# Patient Record
Sex: MALE | Race: WHITE | NOT HISPANIC OR LATINO | URBAN - METROPOLITAN AREA
[De-identification: names, ages, dates, MRNs, and addresses within clinical notes are randomized per-mention and may not be internally consistent; named-entity substitution may affect disease eponyms.]

---

## 2017-04-22 ENCOUNTER — EMERGENCY (EMERGENCY)
Facility: HOSPITAL | Age: 27
LOS: 1 days | Discharge: HOME | End: 2017-04-22
Attending: EMERGENCY MEDICINE | Admitting: EMERGENCY MEDICINE
Payer: MEDICAID

## 2017-04-22 VITALS
DIASTOLIC BLOOD PRESSURE: 66 MMHG | HEART RATE: 80 BPM | SYSTOLIC BLOOD PRESSURE: 115 MMHG | OXYGEN SATURATION: 100 % | RESPIRATION RATE: 20 BRPM | HEIGHT: 69 IN | TEMPERATURE: 98 F | WEIGHT: 175.05 LBS

## 2017-04-22 DIAGNOSIS — F11.10 OPIOID ABUSE, UNCOMPLICATED: ICD-10-CM

## 2017-04-22 DIAGNOSIS — Z87.891 PERSONAL HISTORY OF NICOTINE DEPENDENCE: ICD-10-CM

## 2017-04-22 DIAGNOSIS — Z79.899 OTHER LONG TERM (CURRENT) DRUG THERAPY: ICD-10-CM

## 2017-04-22 DIAGNOSIS — R07.89 OTHER CHEST PAIN: ICD-10-CM

## 2017-04-22 PROCEDURE — 99284 EMERGENCY DEPT VISIT MOD MDM: CPT

## 2017-04-22 PROCEDURE — 71020: CPT | Mod: 26

## 2017-04-22 PROCEDURE — 93010 ELECTROCARDIOGRAM REPORT: CPT

## 2017-04-22 PROCEDURE — 99284 EMERGENCY DEPT VISIT MOD MDM: CPT | Mod: 25

## 2017-04-22 PROCEDURE — 71046 X-RAY EXAM CHEST 2 VIEWS: CPT

## 2017-04-22 PROCEDURE — 93005 ELECTROCARDIOGRAM TRACING: CPT

## 2017-04-22 NOTE — ED PROVIDER NOTE - OBJECTIVE STATEMENT
pt presents with left sided intermittent achy chest pain no h.o dvt or pe last ivda 2 days prior denies SI or HI no hemoptysis no murmur no h.o endocarditis. denies fever. denies HA or neck pain. no  sob. no abd pain. no n/v/d. no urinary f/u/d. no back pain. no motor or sensory deficits. denies drug use. no recent travel. no rash. no other acute issues symptoms or concerns. no h.o exertional syncope or early sudden cardiac death in family

## 2017-04-22 NOTE — ED ADULT TRIAGE NOTE - CHIEF COMPLAINT QUOTE
Patient arrived to ED today with c/o chest pain after taking Suboxone starting yesterday.  Patient states he has also chills.

## 2017-04-22 NOTE — ED ADULT NURSE NOTE - OBJECTIVE STATEMENT
Patient recd this morning A/Ox3, VSS, c/o left sided chest pain and generalized body aches that started yesterday.  Patient states he used heroin 2 days ago and currently taking suboxone. Respirations are even and unlabored, lungs cta, +bowel x4 quads, abdomen soft, nontender/nondistended, skin w/d/i. Patient states he feels nauseous and wants to vomit, reports diarrhea.

## 2017-04-22 NOTE — ED PROVIDER NOTE - MEDICAL DECISION MAKING DETAILS
pt refusing resources given to mother suspect opiod withdrawal compenent last opiod use 2 days prior he is ambulating in ed no no neuro deficits do not suspect endocarditis return to ed for intractable chest pain or sob.

## 2017-04-22 NOTE — ED ADULT NURSE REASSESSMENT NOTE - NS ED NURSE REASSESS COMMENT FT1
Patient wanted to stay at  [facility] for detox, was advised to see SBIRT, patient decided he wanted to leave and walked out before signing discharge papers.

## 2017-04-22 NOTE — ED ADULT NURSE NOTE - NS ED NURSE DC INFO COMPLEXITY
Returned Demonstration/Simple: Patient demonstrates quick and easy understanding/Verbalized Understanding/Patient asked questions

## 2017-04-22 NOTE — ED BEHAVIORAL HEALTH NOTE - BEHAVIORAL HEALTH NOTE
SW Note- SW attempted to meet with pt bedside to provide substance abuse resources and referrals. Pt no longer in hospital bed, d/c'd and left the ED. SW available in the event that pt returns.

## 2017-09-28 ENCOUNTER — APPOINTMENT (OUTPATIENT)
Dept: GASTROENTEROLOGY | Facility: CLINIC | Age: 27
End: 2017-09-28

## 2017-11-01 ENCOUNTER — OUTPATIENT (OUTPATIENT)
Dept: OUTPATIENT SERVICES | Facility: HOSPITAL | Age: 27
LOS: 1 days | End: 2017-11-01
Payer: MEDICAID

## 2017-11-01 PROCEDURE — G9001: CPT

## 2017-11-07 DIAGNOSIS — R69 ILLNESS, UNSPECIFIED: ICD-10-CM

## 2017-11-09 ENCOUNTER — APPOINTMENT (OUTPATIENT)
Dept: GASTROENTEROLOGY | Facility: CLINIC | Age: 27
End: 2017-11-09

## 2018-03-01 ENCOUNTER — OUTPATIENT (OUTPATIENT)
Dept: OUTPATIENT SERVICES | Facility: HOSPITAL | Age: 28
LOS: 1 days | End: 2018-03-01
Payer: MEDICAID

## 2018-03-01 PROCEDURE — G9001: CPT

## 2018-03-09 DIAGNOSIS — R69 ILLNESS, UNSPECIFIED: ICD-10-CM

## 2018-09-28 PROBLEM — F11.10 OPIOID ABUSE, UNCOMPLICATED: Chronic | Status: ACTIVE | Noted: 2017-04-22

## 2018-10-03 ENCOUNTER — TRANSCRIPTION ENCOUNTER (OUTPATIENT)
Age: 28
End: 2018-10-03

## 2018-11-19 ENCOUNTER — APPOINTMENT (OUTPATIENT)
Dept: GASTROENTEROLOGY | Facility: CLINIC | Age: 28
End: 2018-11-19

## 2019-07-25 ENCOUNTER — EMERGENCY (EMERGENCY)
Facility: HOSPITAL | Age: 29
LOS: 1 days | Discharge: LEFT BEFORE DISCHARGE | End: 2019-07-25
Attending: EMERGENCY MEDICINE
Payer: MEDICAID

## 2019-07-25 VITALS
HEART RATE: 83 BPM | HEIGHT: 67 IN | DIASTOLIC BLOOD PRESSURE: 79 MMHG | OXYGEN SATURATION: 100 % | RESPIRATION RATE: 16 BRPM | TEMPERATURE: 98 F | WEIGHT: 160.06 LBS | SYSTOLIC BLOOD PRESSURE: 147 MMHG

## 2019-07-25 PROCEDURE — 99283 EMERGENCY DEPT VISIT LOW MDM: CPT

## 2019-07-25 PROCEDURE — 99282 EMERGENCY DEPT VISIT SF MDM: CPT

## 2019-07-25 NOTE — ED ADULT TRIAGE NOTE - CHIEF COMPLAINT QUOTE
Pt BIBA awake and alert after doing heroin. Pt with stable VS and does not wish to be treated. MD called to triage to evaluate patient, patient is OK to leave AMA

## 2019-07-25 NOTE — ED PROVIDER NOTE - CLINICAL SUMMARY MEDICAL DECISION MAKING FREE TEXT BOX
pt is currently awake, alert, lucid and coherent with mild signs of opioid withdrawal, no acute signs of intoxication. Pt refusing service, displays decision making capacity, advised pt that narcan has short half life and he is at risk for recurrent overdose and death and permanent disability, which he states he understands. pt walked out prior to signing papers

## 2019-07-25 NOTE — ED PROVIDER NOTE - OBJECTIVE STATEMENT
29 year old male with PMH IVDA presents following heroin use. pt states that he used heroin today, was found obtunded, narcan administered and he woke up. Pt states he has no complaints at this time other than nausea. Pt states he would like to refuse service and sign himself out against medical advice

## 2019-11-08 ENCOUNTER — EMERGENCY (EMERGENCY)
Facility: HOSPITAL | Age: 29
LOS: 1 days | Discharge: HOME | End: 2019-11-08
Attending: EMERGENCY MEDICINE
Payer: MEDICAID

## 2019-11-08 VITALS
WEIGHT: 175.05 LBS | HEART RATE: 65 BPM | HEIGHT: 69 IN | DIASTOLIC BLOOD PRESSURE: 47 MMHG | RESPIRATION RATE: 18 BRPM | SYSTOLIC BLOOD PRESSURE: 110 MMHG | OXYGEN SATURATION: 97 % | TEMPERATURE: 98 F

## 2019-11-08 LAB
ANION GAP SERPL CALC-SCNC: 15 MMOL/L — SIGNIFICANT CHANGE UP (ref 5–17)
BASOPHILS # BLD AUTO: 0.02 K/UL — SIGNIFICANT CHANGE UP (ref 0–0.2)
BASOPHILS NFR BLD AUTO: 0.3 % — SIGNIFICANT CHANGE UP (ref 0–2)
BUN SERPL-MCNC: 8 MG/DL — SIGNIFICANT CHANGE UP (ref 8–20)
CALCIUM SERPL-MCNC: 9.4 MG/DL — SIGNIFICANT CHANGE UP (ref 8.6–10.2)
CHLORIDE SERPL-SCNC: 99 MMOL/L — SIGNIFICANT CHANGE UP (ref 98–107)
CO2 SERPL-SCNC: 22 MMOL/L — SIGNIFICANT CHANGE UP (ref 22–29)
CREAT SERPL-MCNC: 0.74 MG/DL — SIGNIFICANT CHANGE UP (ref 0.5–1.3)
EOSINOPHIL # BLD AUTO: 0.1 K/UL — SIGNIFICANT CHANGE UP (ref 0–0.5)
EOSINOPHIL NFR BLD AUTO: 1.4 % — SIGNIFICANT CHANGE UP (ref 0–6)
GLUCOSE SERPL-MCNC: 99 MG/DL — SIGNIFICANT CHANGE UP (ref 70–115)
HCT VFR BLD CALC: 52.1 % — HIGH (ref 39–50)
HGB BLD-MCNC: 16.1 G/DL — SIGNIFICANT CHANGE UP (ref 13–17)
IMM GRANULOCYTES NFR BLD AUTO: 0.4 % — SIGNIFICANT CHANGE UP (ref 0–1.5)
LYMPHOCYTES # BLD AUTO: 1.11 K/UL — SIGNIFICANT CHANGE UP (ref 1–3.3)
LYMPHOCYTES # BLD AUTO: 15.8 % — SIGNIFICANT CHANGE UP (ref 13–44)
MCHC RBC-ENTMCNC: 29.3 PG — SIGNIFICANT CHANGE UP (ref 27–34)
MCHC RBC-ENTMCNC: 30.9 GM/DL — LOW (ref 32–36)
MCV RBC AUTO: 94.7 FL — SIGNIFICANT CHANGE UP (ref 80–100)
MONOCYTES # BLD AUTO: 0.33 K/UL — SIGNIFICANT CHANGE UP (ref 0–0.9)
MONOCYTES NFR BLD AUTO: 4.7 % — SIGNIFICANT CHANGE UP (ref 2–14)
NEUTROPHILS # BLD AUTO: 5.45 K/UL — SIGNIFICANT CHANGE UP (ref 1.8–7.4)
NEUTROPHILS NFR BLD AUTO: 77.4 % — HIGH (ref 43–77)
PLATELET # BLD AUTO: 156 K/UL — SIGNIFICANT CHANGE UP (ref 150–400)
POTASSIUM SERPL-MCNC: 5.4 MMOL/L — HIGH (ref 3.5–5.3)
POTASSIUM SERPL-SCNC: 5.4 MMOL/L — HIGH (ref 3.5–5.3)
RBC # BLD: 5.5 M/UL — SIGNIFICANT CHANGE UP (ref 4.2–5.8)
RBC # FLD: 12.4 % — SIGNIFICANT CHANGE UP (ref 10.3–14.5)
SODIUM SERPL-SCNC: 136 MMOL/L — SIGNIFICANT CHANGE UP (ref 135–145)
WBC # BLD: 7.04 K/UL — SIGNIFICANT CHANGE UP (ref 3.8–10.5)
WBC # FLD AUTO: 7.04 K/UL — SIGNIFICANT CHANGE UP (ref 3.8–10.5)

## 2019-11-08 PROCEDURE — 99283 EMERGENCY DEPT VISIT LOW MDM: CPT

## 2019-11-08 PROCEDURE — 99284 EMERGENCY DEPT VISIT MOD MDM: CPT

## 2019-11-08 PROCEDURE — 80048 BASIC METABOLIC PNL TOTAL CA: CPT

## 2019-11-08 PROCEDURE — 85027 COMPLETE CBC AUTOMATED: CPT

## 2019-11-08 PROCEDURE — 36415 COLL VENOUS BLD VENIPUNCTURE: CPT

## 2019-11-08 RX ORDER — METHADONE HYDROCHLORIDE 40 MG/1
60 TABLET ORAL ONCE
Refills: 0 | Status: DISCONTINUED | OUTPATIENT
Start: 2019-11-08 | End: 2019-11-08

## 2019-11-08 RX ADMIN — METHADONE HYDROCHLORIDE 60 MILLIGRAM(S): 40 TABLET ORAL at 13:26

## 2019-11-08 NOTE — SBIRT NOTE ADULT - NSSBIRTDRGPASSREFTXDET_GEN_A_CORE
Provided SBIRT services: Full screen positive. Referral to Treatment attempted. Screening results were reviewed with the patient and patient was provided information about healthy guidelines and potential negative consequences associated with level of risk. Motivation and readiness to reduce or stop use was discussed and goals and activities to make changes were suggested/offered.  Options discussed for further evaluation and treatment, but referral to treatment was not completed because  Pt is currently at the [facility] -  recommended [facility] and/or [email or web address]

## 2019-11-08 NOTE — ED STATDOCS - PATIENT PORTAL LINK FT
You can access the FollowMyHealth Patient Portal offered by [facility] by registering at the following website: [email or web address]. By joining [employer]’s FollowMyHealth portal, you will also be able to view your health information using other applications (apps) compatible with our system.

## 2019-11-08 NOTE — ED STATDOCS - OBJECTIVE STATEMENT
30y/o M with PMHx of Heroine abuse presents to the ED c/o left hand swelling s/p injecting heroine into his arm, onset today. Pt reports arm started burning after injection onset so he stopped. Pt uses Heroine once a week. He reports that he is on the Methadone program and is trying to stop. Denies N/V. No additional complaints at this time.

## 2019-11-08 NOTE — ED STATDOCS - PROGRESS NOTE DETAILS
Pt moved form intake Room. Pt seen and evaluated by intake Physician. HPI, Physical examination performed by intake Physician . Note reviewed and followup examination performed by me consistent with initial assessment. Agrees with intake Physician plan and tests. Pt admits to using Heroin today prior to presenting in the ED. Pt Methadone Program was called and informed about pt use today. Lucia CHERRY states that Pt can be dosed if he does not appears impaired. Pt given resources for [facility] and [facility] today . Pt spoke to JASON.

## 2019-11-08 NOTE — ED STATDOCS - CLINICAL SUMMARY MEDICAL DECISION MAKING FREE TEXT BOX
Pt with swelling to LUE after shooting Heroine, will check labs, US and reassess. Pt with swelling to LUE after shooting Heroine, will check labs, US and reassess.  imaging cancelled   signed AMA bc of long waiti time  no white count or fever  risk benefit alternative discussed  pt verbalized understanding of all and signed ama  encouraged to return for any problems including fever redness increased swelling or pain

## 2019-11-08 NOTE — ED ADULT NURSE NOTE - OBJECTIVE STATEMENT
pt care assumed at 1135, no apparent distress noted at this time. pt received Alert and Oriented to person, place, situation and time sitting in bed with mother at bedside. pt c/o left hand swelling s/p injecting self this morning. pt states needle is fully out. pt has swelling to left hand. pt able to move extremity. pt awaiting sono at this time. pt educated on plan of care, pt able to successfully teach back plan of care to RN, RN will continue to reeducate pt during hospital stay.
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
## 2019-11-08 NOTE — ED STATDOCS - MUSCULOSKELETAL, MLM
range of motion is not limited and there is no muscle tenderness. range of motion is not limited and there is no muscle tenderness. Neuro vascular intact.

## 2019-11-08 NOTE — ED STATDOCS - ATTENDING CONTRIBUTION TO CARE
I, Kirsten River, performed the initial face to face bedside interview with this patient regarding history of present illness, review of symptoms and relevant past medical, social and family history.  I completed an independent physical examination.  I was the initial provider who evaluated this patient. I have signed out the follow up of any pending tests (i.e. labs, radiological studies) to the ACP.  I have communicated the patient’s plan of care and disposition with the ACP.  The history, relevant review of systems, past medical and surgical history, medical decision making, and physical examination was documented by the scribe in my presence and I attest to the accuracy of the documentation.

## 2019-12-26 ENCOUNTER — EMERGENCY (EMERGENCY)
Facility: HOSPITAL | Age: 29
LOS: 1 days | Discharge: LEFT BEFORE DISCHARGE | End: 2019-12-26

## 2020-01-08 ENCOUNTER — EMERGENCY (EMERGENCY)
Facility: HOSPITAL | Age: 30
LOS: 1 days | End: 2020-01-08

## 2020-01-10 ENCOUNTER — APPOINTMENT (OUTPATIENT)
Dept: NEUROLOGY | Facility: CLINIC | Age: 30
End: 2020-01-10

## 2020-01-13 ENCOUNTER — APPOINTMENT (OUTPATIENT)
Dept: GASTROENTEROLOGY | Facility: CLINIC | Age: 30
End: 2020-01-13

## 2020-02-13 ENCOUNTER — EMERGENCY (EMERGENCY)
Facility: HOSPITAL | Age: 30
LOS: 1 days | Discharge: HOME | End: 2020-02-13
Attending: EMERGENCY MEDICINE
Payer: MEDICAID

## 2020-02-13 VITALS
SYSTOLIC BLOOD PRESSURE: 128 MMHG | WEIGHT: 190.04 LBS | TEMPERATURE: 101 F | HEIGHT: 69 IN | OXYGEN SATURATION: 100 % | HEART RATE: 99 BPM | RESPIRATION RATE: 24 BRPM | DIASTOLIC BLOOD PRESSURE: 60 MMHG

## 2020-02-13 LAB
ALBUMIN SERPL ELPH-MCNC: 3.9 G/DL — SIGNIFICANT CHANGE UP (ref 3.3–5.2)
ALP SERPL-CCNC: 108 U/L — SIGNIFICANT CHANGE UP (ref 40–120)
ALT FLD-CCNC: 26 U/L — SIGNIFICANT CHANGE UP
ANION GAP SERPL CALC-SCNC: 17 MMOL/L — SIGNIFICANT CHANGE UP (ref 5–17)
APPEARANCE UR: CLEAR — SIGNIFICANT CHANGE UP
APTT BLD: 25.1 SEC — LOW (ref 27.5–36.3)
AST SERPL-CCNC: 42 U/L — HIGH
BACTERIA # UR AUTO: ABNORMAL
BASOPHILS # BLD AUTO: 0.01 K/UL — SIGNIFICANT CHANGE UP (ref 0–0.2)
BASOPHILS NFR BLD AUTO: 0.2 % — SIGNIFICANT CHANGE UP (ref 0–2)
BILIRUB SERPL-MCNC: 0.3 MG/DL — LOW (ref 0.4–2)
BILIRUB UR-MCNC: NEGATIVE — SIGNIFICANT CHANGE UP
BUN SERPL-MCNC: 11 MG/DL — SIGNIFICANT CHANGE UP (ref 8–20)
CALCIUM SERPL-MCNC: 9.3 MG/DL — SIGNIFICANT CHANGE UP (ref 8.6–10.2)
CHLORIDE SERPL-SCNC: 100 MMOL/L — SIGNIFICANT CHANGE UP (ref 98–107)
CO2 SERPL-SCNC: 22 MMOL/L — SIGNIFICANT CHANGE UP (ref 22–29)
COLOR SPEC: YELLOW — SIGNIFICANT CHANGE UP
CREAT SERPL-MCNC: 0.97 MG/DL — SIGNIFICANT CHANGE UP (ref 0.5–1.3)
DIFF PNL FLD: NEGATIVE — SIGNIFICANT CHANGE UP
EOSINOPHIL # BLD AUTO: 0.01 K/UL — SIGNIFICANT CHANGE UP (ref 0–0.5)
EOSINOPHIL NFR BLD AUTO: 0.2 % — SIGNIFICANT CHANGE UP (ref 0–6)
EPI CELLS # UR: NEGATIVE — SIGNIFICANT CHANGE UP
GLUCOSE SERPL-MCNC: 89 MG/DL — SIGNIFICANT CHANGE UP (ref 70–99)
GLUCOSE UR QL: NEGATIVE MG/DL — SIGNIFICANT CHANGE UP
HCT VFR BLD CALC: 45.3 % — SIGNIFICANT CHANGE UP (ref 39–50)
HGB BLD-MCNC: 14.6 G/DL — SIGNIFICANT CHANGE UP (ref 13–17)
IMM GRANULOCYTES NFR BLD AUTO: 0.4 % — SIGNIFICANT CHANGE UP (ref 0–1.5)
INR BLD: 1.01 RATIO — SIGNIFICANT CHANGE UP (ref 0.88–1.16)
KETONES UR-MCNC: NEGATIVE — SIGNIFICANT CHANGE UP
LACTATE BLDV-MCNC: 2 MMOL/L — SIGNIFICANT CHANGE UP (ref 0.5–2)
LACTATE BLDV-MCNC: 4.1 MMOL/L — CRITICAL HIGH (ref 0.5–2)
LEUKOCYTE ESTERASE UR-ACNC: ABNORMAL
LYMPHOCYTES # BLD AUTO: 0.77 K/UL — LOW (ref 1–3.3)
LYMPHOCYTES # BLD AUTO: 17.1 % — SIGNIFICANT CHANGE UP (ref 13–44)
MCHC RBC-ENTMCNC: 30.4 PG — SIGNIFICANT CHANGE UP (ref 27–34)
MCHC RBC-ENTMCNC: 32.2 GM/DL — SIGNIFICANT CHANGE UP (ref 32–36)
MCV RBC AUTO: 94.4 FL — SIGNIFICANT CHANGE UP (ref 80–100)
MONOCYTES # BLD AUTO: 0.05 K/UL — SIGNIFICANT CHANGE UP (ref 0–0.9)
MONOCYTES NFR BLD AUTO: 1.1 % — LOW (ref 2–14)
NEUTROPHILS # BLD AUTO: 3.63 K/UL — SIGNIFICANT CHANGE UP (ref 1.8–7.4)
NEUTROPHILS NFR BLD AUTO: 81 % — HIGH (ref 43–77)
NITRITE UR-MCNC: NEGATIVE — SIGNIFICANT CHANGE UP
PH UR: 6 — SIGNIFICANT CHANGE UP (ref 5–8)
PLATELET # BLD AUTO: 177 K/UL — SIGNIFICANT CHANGE UP (ref 150–400)
POTASSIUM SERPL-MCNC: 5.3 MMOL/L — SIGNIFICANT CHANGE UP (ref 3.5–5.3)
POTASSIUM SERPL-SCNC: 5.3 MMOL/L — SIGNIFICANT CHANGE UP (ref 3.5–5.3)
PROT SERPL-MCNC: 7.1 G/DL — SIGNIFICANT CHANGE UP (ref 6.6–8.7)
PROT UR-MCNC: NEGATIVE MG/DL — SIGNIFICANT CHANGE UP
PROTHROM AB SERPL-ACNC: 11.4 SEC — SIGNIFICANT CHANGE UP (ref 10–12.9)
RAPID RVP RESULT: SIGNIFICANT CHANGE UP
RBC # BLD: 4.8 M/UL — SIGNIFICANT CHANGE UP (ref 4.2–5.8)
RBC # FLD: 12.8 % — SIGNIFICANT CHANGE UP (ref 10.3–14.5)
RBC CASTS # UR COMP ASSIST: SIGNIFICANT CHANGE UP /HPF (ref 0–4)
SODIUM SERPL-SCNC: 139 MMOL/L — SIGNIFICANT CHANGE UP (ref 135–145)
SP GR SPEC: 1.01 — SIGNIFICANT CHANGE UP (ref 1.01–1.02)
UROBILINOGEN FLD QL: NEGATIVE MG/DL — SIGNIFICANT CHANGE UP
WBC # BLD: 4.49 K/UL — SIGNIFICANT CHANGE UP (ref 3.8–10.5)
WBC # FLD AUTO: 4.49 K/UL — SIGNIFICANT CHANGE UP (ref 3.8–10.5)
WBC UR QL: SIGNIFICANT CHANGE UP

## 2020-02-13 PROCEDURE — 93010 ELECTROCARDIOGRAM REPORT: CPT

## 2020-02-13 PROCEDURE — 71045 X-RAY EXAM CHEST 1 VIEW: CPT | Mod: 26

## 2020-02-13 PROCEDURE — 99291 CRITICAL CARE FIRST HOUR: CPT

## 2020-02-13 RX ORDER — IBUPROFEN 200 MG
600 TABLET ORAL ONCE
Refills: 0 | Status: COMPLETED | OUTPATIENT
Start: 2020-02-13 | End: 2020-02-13

## 2020-02-13 RX ORDER — SODIUM CHLORIDE 9 MG/ML
2700 INJECTION INTRAMUSCULAR; INTRAVENOUS; SUBCUTANEOUS ONCE
Refills: 0 | Status: COMPLETED | OUTPATIENT
Start: 2020-02-13 | End: 2020-02-13

## 2020-02-13 RX ORDER — ACETAMINOPHEN 500 MG
975 TABLET ORAL ONCE
Refills: 0 | Status: COMPLETED | OUTPATIENT
Start: 2020-02-13 | End: 2020-02-13

## 2020-02-13 RX ORDER — AZITHROMYCIN 500 MG/1
500 TABLET, FILM COATED ORAL ONCE
Refills: 0 | Status: COMPLETED | OUTPATIENT
Start: 2020-02-13 | End: 2020-02-13

## 2020-02-13 RX ORDER — CEFTRIAXONE 500 MG/1
1000 INJECTION, POWDER, FOR SOLUTION INTRAMUSCULAR; INTRAVENOUS ONCE
Refills: 0 | Status: COMPLETED | OUTPATIENT
Start: 2020-02-13 | End: 2020-02-13

## 2020-02-13 RX ADMIN — SODIUM CHLORIDE 2700 MILLILITER(S): 9 INJECTION INTRAMUSCULAR; INTRAVENOUS; SUBCUTANEOUS at 19:59

## 2020-02-13 RX ADMIN — AZITHROMYCIN 255 MILLIGRAM(S): 500 TABLET, FILM COATED ORAL at 21:01

## 2020-02-13 RX ADMIN — CEFTRIAXONE 100 MILLIGRAM(S): 500 INJECTION, POWDER, FOR SOLUTION INTRAMUSCULAR; INTRAVENOUS at 20:00

## 2020-02-13 RX ADMIN — Medication 975 MILLIGRAM(S): at 20:00

## 2020-02-13 NOTE — ED PROVIDER NOTE - NSFOLLOWUPINSTRUCTIONS_ED_ALL_ED_FT
- Follow up with your doctor within 2-3 days.   - Bring results with you to the appointment.   - Take Tylenol (Acetaminophen) 650mg or Motrin (Ibuprofen/Advil) 600mg every 6 hours as needed for pain or fever  - Stay well hydrated.   - Return to the ED for any new or worsening symptoms.     Fever    A fever is an increase in the body's temperature above 100.4°F (38°C) or higher. In adults and children older than three months, a brief mild or moderate fever generally has no long-term effect, and it usually does not require treatment. Many times, fevers are the result of viral infections, which are self-resolving.  However, certain symptoms or diagnostic tests may suggest a bacterial infection that may respond to antibiotics. Take medications as directed by your health care provider.    SEEK IMMEDIATE MEDICAL CARE IF YOU OR YOUR CHILD HAVE ANY OF THE FOLLOWING SYMPTOMS : shortness of breath, seizure, rash/stiff neck/headache, severe abdominal pain, persistent vomiting, any signs of dehydration, or if your child has a fever for over five (5) days.

## 2020-02-13 NOTE — ED ADULT NURSE NOTE - CHIEF COMPLAINT QUOTE
Patient arrived to ED today with c/o body shakes, weakness, delusional as per girlfriend, nausea that started today about an hour ago.  Patient reports he recently had teeth pulled one week ago and did not take any antibiotics.

## 2020-02-13 NOTE — ED PROVIDER NOTE - ATTENDING CONTRIBUTION TO CARE
Danish: I performed a face to face bedside interview with patient regarding history of present illness, review of symptoms and past medical history. I completed an independent physical exam and ordered tests/medications as needed.  I have discussed patient's plan of care with the resident. The resident assisted in  executing the discussed plan. I was available for any questions or issues that may have arose during the execution of the plan of care.

## 2020-02-13 NOTE — ED ADULT TRIAGE NOTE - CHIEF COMPLAINT QUOTE
Patient arrived to ED today with c/o body shakes, nausea that started today about an hour ago.  Patient reports he recently had teeth pulled. Patient arrived to ED today with c/o body shakes, weakness, delusional as per girlfriend, nausea that started today about an hour ago.  Patient reports he recently had teeth pulled one week ago and did not take any antibiotics.

## 2020-02-13 NOTE — ED PROVIDER NOTE - PROGRESS NOTE DETAILS
Danish GAN: pt feeling a little better but still drowsy, not at baseline as per girlfriend at bedside. Pt sent for CTH. Pt is consented for LP. Danish GAN: attempted LP, dry tap x 2. Pt now more awake, as per pt and girlfriend the pt is back to baseline. Pt requesting to go home. Strict return precautions given. Patient and girlfriend verbalize understanding.

## 2020-02-13 NOTE — ED ADULT NURSE NOTE - OBJECTIVE STATEMENT
Pt c/o chills and fever since 7pm today. Pt is drowsy, easily arrousable. Fever 102.4, pt with fluids and abx infusing at this time, sepsis protocol initiated. Pt reports pain 7/10 in his lower flank area radiating to spine, described as shooting pain. As per girlfriend, pt had similar pain in the past when he had kidney stones 6yrs ago. Pt has +N/V. Pt also reports having dental work done 1 wk ago and was not able to fill his abx until 4 days after. Pt states he did not take his abx today.

## 2020-02-13 NOTE — ED PROVIDER NOTE - PHYSICAL EXAMINATION
Gen: Well appearing in NAD  Head: NC/AT  Mouth: S/p multiple tooth extractions, no edema, no tongue elvation, no swelling, no purulence  Neck: trachea midline  CV: RRR  Resp:  CTAB  Ext: no deformities  Neuro:  A&O appears non focal  Skin:  Warm and dry as visualized  Psych:  Normal affect and mood
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
## 2020-02-13 NOTE — ED PROVIDER NOTE - PATIENT PORTAL LINK FT
You can access the FollowMyHealth Patient Portal offered by [facility] by registering at the following website: [email or web address]. By joining [employer]’s FollowMyHealth portal, you will also be able to view your health information using other applications (apps) compatible with our system.

## 2020-02-13 NOTE — ED ADULT NURSE NOTE - NSIMPLEMENTINTERV_GEN_ALL_ED
Implemented All Fall Risk Interventions:  [place] to call system. Call bell, personal items and telephone within reach. Instruct patient to call for assistance. Room bathroom lighting operational. Non-slip footwear when patient is off stretcher. Physically safe environment: no spills, clutter or unnecessary equipment. Stretcher in lowest position, wheels locked, appropriate side rails in place. Provide visual cue, wrist band, yellow gown, etc. Monitor gait and stability. Monitor for mental status changes and reorient to person, place, and time. Review medications for side effects contributing to fall risk. Reinforce activity limits and safety measures with patient and family.

## 2020-02-14 ENCOUNTER — EMERGENCY (EMERGENCY)
Facility: HOSPITAL | Age: 30
LOS: 1 days | Discharge: HOME | End: 2020-02-14
Attending: EMERGENCY MEDICINE
Payer: MEDICAID

## 2020-02-14 VITALS
TEMPERATURE: 98 F | RESPIRATION RATE: 18 BRPM | DIASTOLIC BLOOD PRESSURE: 65 MMHG | SYSTOLIC BLOOD PRESSURE: 118 MMHG | HEART RATE: 82 BPM | OXYGEN SATURATION: 98 %

## 2020-02-14 VITALS
SYSTOLIC BLOOD PRESSURE: 110 MMHG | OXYGEN SATURATION: 97 % | TEMPERATURE: 98 F | DIASTOLIC BLOOD PRESSURE: 50 MMHG | WEIGHT: 190.04 LBS | HEIGHT: 69 IN | HEART RATE: 84 BPM | RESPIRATION RATE: 20 BRPM

## 2020-02-14 LAB
ALBUMIN SERPL ELPH-MCNC: 3.3 G/DL — SIGNIFICANT CHANGE UP (ref 3.3–5.2)
ALP SERPL-CCNC: 71 U/L — SIGNIFICANT CHANGE UP (ref 40–120)
ALT FLD-CCNC: 25 U/L — SIGNIFICANT CHANGE UP
ANION GAP SERPL CALC-SCNC: 13 MMOL/L — SIGNIFICANT CHANGE UP (ref 5–17)
AST SERPL-CCNC: 42 U/L — HIGH
BASOPHILS # BLD AUTO: 0 K/UL — SIGNIFICANT CHANGE UP (ref 0–0.2)
BASOPHILS NFR BLD AUTO: 0 % — SIGNIFICANT CHANGE UP (ref 0–2)
BILIRUB SERPL-MCNC: 0.3 MG/DL — LOW (ref 0.4–2)
BUN SERPL-MCNC: 10 MG/DL — SIGNIFICANT CHANGE UP (ref 8–20)
CALCIUM SERPL-MCNC: 8.7 MG/DL — SIGNIFICANT CHANGE UP (ref 8.6–10.2)
CHLORIDE SERPL-SCNC: 102 MMOL/L — SIGNIFICANT CHANGE UP (ref 98–107)
CO2 SERPL-SCNC: 22 MMOL/L — SIGNIFICANT CHANGE UP (ref 22–29)
CREAT SERPL-MCNC: 0.95 MG/DL — SIGNIFICANT CHANGE UP (ref 0.5–1.3)
CULTURE RESULTS: SIGNIFICANT CHANGE UP
EOSINOPHIL # BLD AUTO: 0 K/UL — SIGNIFICANT CHANGE UP (ref 0–0.5)
EOSINOPHIL NFR BLD AUTO: 0 % — SIGNIFICANT CHANGE UP (ref 0–6)
GIANT PLATELETS BLD QL SMEAR: PRESENT — SIGNIFICANT CHANGE UP
GLUCOSE SERPL-MCNC: 121 MG/DL — HIGH (ref 70–99)
HCT VFR BLD CALC: 40.4 % — SIGNIFICANT CHANGE UP (ref 39–50)
HGB BLD-MCNC: 12.8 G/DL — LOW (ref 13–17)
LACTATE BLDV-MCNC: 2 MMOL/L — SIGNIFICANT CHANGE UP (ref 0.5–2)
LYMPHOCYTES # BLD AUTO: 0.23 K/UL — LOW (ref 1–3.3)
LYMPHOCYTES # BLD AUTO: 1.7 % — LOW (ref 13–44)
MANUAL SMEAR VERIFICATION: SIGNIFICANT CHANGE UP
MCHC RBC-ENTMCNC: 30.1 PG — SIGNIFICANT CHANGE UP (ref 27–34)
MCHC RBC-ENTMCNC: 31.7 GM/DL — LOW (ref 32–36)
MCV RBC AUTO: 95.1 FL — SIGNIFICANT CHANGE UP (ref 80–100)
METAMYELOCYTES # FLD: 2.6 % — HIGH (ref 0–0)
MONOCYTES # BLD AUTO: 0.35 K/UL — SIGNIFICANT CHANGE UP (ref 0–0.9)
MONOCYTES NFR BLD AUTO: 2.6 % — SIGNIFICANT CHANGE UP (ref 2–14)
MYELOCYTES NFR BLD: 1.7 % — HIGH (ref 0–0)
NEUTROPHILS # BLD AUTO: 12.36 K/UL — HIGH (ref 1.8–7.4)
NEUTROPHILS NFR BLD AUTO: 67 % — SIGNIFICANT CHANGE UP (ref 43–77)
NEUTS BAND # BLD: 24.4 % — HIGH (ref 0–8)
PLAT MORPH BLD: NORMAL — SIGNIFICANT CHANGE UP
PLATELET # BLD AUTO: 90 K/UL — LOW (ref 150–400)
POTASSIUM SERPL-MCNC: 3.7 MMOL/L — SIGNIFICANT CHANGE UP (ref 3.5–5.3)
POTASSIUM SERPL-SCNC: 3.7 MMOL/L — SIGNIFICANT CHANGE UP (ref 3.5–5.3)
PROT SERPL-MCNC: 6.1 G/DL — LOW (ref 6.6–8.7)
RBC # BLD: 4.25 M/UL — SIGNIFICANT CHANGE UP (ref 4.2–5.8)
RBC # FLD: 13.2 % — SIGNIFICANT CHANGE UP (ref 10.3–14.5)
RBC BLD AUTO: NORMAL — SIGNIFICANT CHANGE UP
SODIUM SERPL-SCNC: 137 MMOL/L — SIGNIFICANT CHANGE UP (ref 135–145)
SPECIMEN SOURCE: SIGNIFICANT CHANGE UP
WBC # BLD: 13.52 K/UL — HIGH (ref 3.8–10.5)
WBC # FLD AUTO: 13.52 K/UL — HIGH (ref 3.8–10.5)

## 2020-02-14 PROCEDURE — 85027 COMPLETE CBC AUTOMATED: CPT

## 2020-02-14 PROCEDURE — 87486 CHLMYD PNEUM DNA AMP PROBE: CPT

## 2020-02-14 PROCEDURE — 87086 URINE CULTURE/COLONY COUNT: CPT

## 2020-02-14 PROCEDURE — 83605 ASSAY OF LACTIC ACID: CPT

## 2020-02-14 PROCEDURE — 96374 THER/PROPH/DIAG INJ IV PUSH: CPT

## 2020-02-14 PROCEDURE — 36415 COLL VENOUS BLD VENIPUNCTURE: CPT

## 2020-02-14 PROCEDURE — 99285 EMERGENCY DEPT VISIT HI MDM: CPT | Mod: 25

## 2020-02-14 PROCEDURE — 85730 THROMBOPLASTIN TIME PARTIAL: CPT

## 2020-02-14 PROCEDURE — 96375 TX/PRO/DX INJ NEW DRUG ADDON: CPT

## 2020-02-14 PROCEDURE — 93005 ELECTROCARDIOGRAM TRACING: CPT

## 2020-02-14 PROCEDURE — 85610 PROTHROMBIN TIME: CPT

## 2020-02-14 PROCEDURE — 87633 RESP VIRUS 12-25 TARGETS: CPT

## 2020-02-14 PROCEDURE — 70450 CT HEAD/BRAIN W/O DYE: CPT | Mod: 26

## 2020-02-14 PROCEDURE — 81001 URINALYSIS AUTO W/SCOPE: CPT

## 2020-02-14 PROCEDURE — 70450 CT HEAD/BRAIN W/O DYE: CPT

## 2020-02-14 PROCEDURE — 87581 M.PNEUMON DNA AMP PROBE: CPT

## 2020-02-14 PROCEDURE — 80053 COMPREHEN METABOLIC PANEL: CPT

## 2020-02-14 PROCEDURE — 93010 ELECTROCARDIOGRAM REPORT: CPT

## 2020-02-14 PROCEDURE — 99284 EMERGENCY DEPT VISIT MOD MDM: CPT | Mod: 25

## 2020-02-14 PROCEDURE — 87040 BLOOD CULTURE FOR BACTERIA: CPT

## 2020-02-14 PROCEDURE — 99284 EMERGENCY DEPT VISIT MOD MDM: CPT

## 2020-02-14 PROCEDURE — 87798 DETECT AGENT NOS DNA AMP: CPT

## 2020-02-14 PROCEDURE — 71045 X-RAY EXAM CHEST 1 VIEW: CPT

## 2020-02-14 RX ORDER — SODIUM CHLORIDE 9 MG/ML
1000 INJECTION INTRAMUSCULAR; INTRAVENOUS; SUBCUTANEOUS ONCE
Refills: 0 | Status: COMPLETED | OUTPATIENT
Start: 2020-02-14 | End: 2020-02-14

## 2020-02-14 RX ORDER — ONDANSETRON 8 MG/1
4 TABLET, FILM COATED ORAL ONCE
Refills: 0 | Status: COMPLETED | OUTPATIENT
Start: 2020-02-14 | End: 2020-02-14

## 2020-02-14 RX ORDER — VANCOMYCIN HCL 1 G
1000 VIAL (EA) INTRAVENOUS ONCE
Refills: 0 | Status: COMPLETED | OUTPATIENT
Start: 2020-02-14 | End: 2020-02-14

## 2020-02-14 RX ORDER — CEFEPIME 1 G/1
2000 INJECTION, POWDER, FOR SOLUTION INTRAMUSCULAR; INTRAVENOUS ONCE
Refills: 0 | Status: COMPLETED | OUTPATIENT
Start: 2020-02-14 | End: 2020-02-14

## 2020-02-14 RX ADMIN — SODIUM CHLORIDE 1000 MILLILITER(S): 9 INJECTION INTRAMUSCULAR; INTRAVENOUS; SUBCUTANEOUS at 01:00

## 2020-02-14 RX ADMIN — Medication 250 MILLIGRAM(S): at 23:10

## 2020-02-14 RX ADMIN — CEFEPIME 100 MILLIGRAM(S): 1 INJECTION, POWDER, FOR SOLUTION INTRAMUSCULAR; INTRAVENOUS at 22:31

## 2020-02-14 RX ADMIN — ONDANSETRON 4 MILLIGRAM(S): 8 TABLET, FILM COATED ORAL at 20:11

## 2020-02-14 RX ADMIN — SODIUM CHLORIDE 1000 MILLILITER(S): 9 INJECTION INTRAMUSCULAR; INTRAVENOUS; SUBCUTANEOUS at 20:11

## 2020-02-14 NOTE — ED PROVIDER NOTE - PATIENT PORTAL LINK FT
You can access the FollowMyHealth Patient Portal offered by [facility] by registering at the following website: [email or web address]. By joining [employer]’s FollowMyHealth portal, you will also be able to view your health information using other applications (apps) compatible with our system.

## 2020-02-14 NOTE — ED PROVIDER NOTE - PROGRESS NOTE DETAILS
Pt feeling much better, no nausea, tolerating PO Pt asking to leave AMA, will finish course of abx and f/u with PCP for repeat labs.  Given strict return precautions.

## 2020-02-14 NOTE — ED PROVIDER NOTE - CLINICAL SUMMARY MEDICAL DECISION MAKING FREE TEXT BOX
Pt is a 29 y/o M presents c/o vomiting, (-) w/u yesterday, will get labs, give IVF bolus, zofran, ekg reassess

## 2020-02-14 NOTE — ED ADULT TRIAGE NOTE - CHIEF COMPLAINT QUOTE
Was here yesterday for confusion, fever and body aches. Had a positive lactate., MD was unable to get the LP but I did not want to stay, was told to come back if I was feeling worse.

## 2020-02-14 NOTE — ED PROVIDER NOTE - ATTENDING CONTRIBUTION TO CARE
AJM: Patient seen with resident and agree with above note.  pt with bandemia. feeling improved. normal exam. no ams. no signs of meningitis. reccomedned observation or admission for bandemia. pt refuses. Patient has requested to leave against medical advice. Extensive time was spent with patient counseling on the imporatance of staying for full evaluation and the dangers of leaving early. Patient understands that by leaving prior to completion of evaluation that this could lead to missed diagnoses resulting in significant morbity and disability as well as death. Patient has verbalized this understanding and agress to return to ER if symptoms worsen and have instructed patient to follow up with PMD promptly.

## 2020-02-14 NOTE — ED ADULT NURSE NOTE - OBJECTIVE STATEMENT
pt received A+Ox4, in no apparent distress. pt breathing even and unlabored. ACOSTA well x4. pt states he left AMA yesterday after coming in for fever and body aches. pt left after an attempted LP yesterday, states he felt better and was told to return to ED if symptoms got worse. placed in iso room upon arrival. pt states today he felt sick all day but symptoms resolved after walking into the ED. pt denies any pain/discomfort at this time. pt educated on POC, verbalized understanding. pt safety measures maintained.

## 2020-02-14 NOTE — ED PROVIDER NOTE - NSFOLLOWUPINSTRUCTIONS_ED_ALL_ED_FT
The patient is alert and oriented x4 and asking to leave.  The patient does not want to be admitted  and understands the risks of leaving including permanent disability and death.  The risks, benefits, hazard, alternatives and precautions were reviewed with the patient and the patient voices understanding. The patient is aware that he should return at any time and that close follow-up with the PMD is recommended ASAP.

## 2020-02-14 NOTE — ED PROVIDER NOTE - NS ED ROS FT
CONSTITUTIONAL: +fevers, no chills  Eyes: No vision changes  Cardiovascular: No Chest pain  Respiratory: No SOB  Gastrointestinal: No n/v/d, no abd pain  Genitourinary: no dysuria, no hematuria  SKIN: no rashes.  NEURO: no headache, no weakness or numbness  PSYCHIATRIC: no known mental health issues.

## 2020-02-14 NOTE — ED PROVIDER NOTE - OBJECTIVE STATEMENT
Pt is a 29 y/o M w/PMHx IVDA on methadone presents c/o vomiting and weakness.  Pt was seen in the ED yesterday as code sepsis with an unremarkable w/u and sent home.  He states he woke up this AM around 9am and felt hot, drank some iced tea nd started vomiting.  He had repeated episodes of NBNB vomiting throughout the day.  He is currently on amoxicillin s/p tooth extraction and took motrin for his fever.  He states he started to feel better prior to arrival to the ED, and is currently w/o nausea and tolerating PO.

## 2020-02-14 NOTE — ED ADULT NURSE NOTE - NSIMPLEMENTINTERV_GEN_ALL_ED
Implemented All Universal Safety Interventions:  [place] to call system. Call bell, personal items and telephone within reach. Instruct patient to call for assistance. Room bathroom lighting operational. Non-slip footwear when patient is off stretcher. Physically safe environment: no spills, clutter or unnecessary equipment. Stretcher in lowest position, wheels locked, appropriate side rails in place.

## 2020-02-18 ENCOUNTER — APPOINTMENT (OUTPATIENT)
Dept: NEUROLOGY | Facility: CLINIC | Age: 30
End: 2020-02-18

## 2020-02-18 LAB
CULTURE RESULTS: SIGNIFICANT CHANGE UP
CULTURE RESULTS: SIGNIFICANT CHANGE UP
SPECIMEN SOURCE: SIGNIFICANT CHANGE UP
SPECIMEN SOURCE: SIGNIFICANT CHANGE UP

## 2021-05-22 ENCOUNTER — EMERGENCY (EMERGENCY)
Facility: HOSPITAL | Age: 31
LOS: 0 days | Discharge: HOME | End: 2021-05-22
Attending: EMERGENCY MEDICINE
Payer: COMMERCIAL

## 2021-05-22 VITALS
SYSTOLIC BLOOD PRESSURE: 126 MMHG | DIASTOLIC BLOOD PRESSURE: 65 MMHG | TEMPERATURE: 98 F | RESPIRATION RATE: 18 BRPM | HEIGHT: 69 IN | HEART RATE: 82 BPM | WEIGHT: 220.02 LBS | OXYGEN SATURATION: 96 %

## 2021-05-22 DIAGNOSIS — S61.217A LACERATION WITHOUT FOREIGN BODY OF LEFT LITTLE FINGER WITHOUT DAMAGE TO NAIL, INITIAL ENCOUNTER: ICD-10-CM
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
## 2021-05-22 DIAGNOSIS — T22.112A BURN OF FIRST DEGREE OF LEFT FOREARM, INITIAL ENCOUNTER: ICD-10-CM

## 2021-05-22 DIAGNOSIS — S40.811A ABRASION OF RIGHT UPPER ARM, INITIAL ENCOUNTER: ICD-10-CM

## 2021-05-22 DIAGNOSIS — S40.812A ABRASION OF LEFT UPPER ARM, INITIAL ENCOUNTER: ICD-10-CM

## 2021-05-22 PROCEDURE — 99283 EMERGENCY DEPT VISIT LOW MDM: CPT | Mod: 25

## 2021-05-22 PROCEDURE — 99284 EMERGENCY DEPT VISIT MOD MDM: CPT | Mod: 25

## 2021-05-22 PROCEDURE — 12001 RPR S/N/AX/GEN/TRNK 2.5CM/<: CPT

## 2021-05-22 NOTE — ED PROCEDURE NOTE - CPROC ED POST PROC CARE GUIDE1
Placed in alumafoam splint/Verbal/written post procedure instructions were given to patient/caregiver./Instructed patient/caregiver to follow-up with primary care physician./Instructed patient/caregiver regarding signs and symptoms of infection./Keep the cast/splint/dressing clean and dry.

## 2021-05-22 NOTE — ED ADULT NURSE NOTE - NSIMPLEMENTINTERV_GEN_ALL_ED
Implemented All Universal Safety Interventions:  [place] to call system. Call bell, personal items and telephone within reach. Instruct patient to call for assistance. Room bathroom lighting operational. Non-slip footwear when patient is off stretcher. Physically safe environment: no spills, clutter or unnecessary equipment. Stretcher in lowest position, wheels locked, appropriate side rails in place.
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
## 2021-05-22 NOTE — ED ADULT NURSE NOTE - PRO INTERPRETER NEED 2
Ongoing afib. This is not new. Patient is schooled in alerting staff if he is not feeling well.  Pt scheduled for in office follow up Early May 2017  Will forward to NP for further review   PAIN English

## 2021-05-22 NOTE — ED PROVIDER NOTE - PATIENT PORTAL LINK FT
You can access the FollowMyHealth Patient Portal offered by [facility] by registering at the following website: [email or web address]. By joining [employer]’s FollowMyHealth portal, you will also be able to view your health information using other applications (apps) compatible with our system.

## 2021-05-22 NOTE — ED PROVIDER NOTE - ENMT, MLM
Labs will be done today.   For normal results, you will receive a letter with the results in about 2 weeks.  If anything is abnormal or unexpected, someone from the clinic will call you.      Diflucan - give her one dose tomorrow.  Hold her Citalopram that day you give this and restart it the next day.   Bring back a urine if you can.     Before Your Surgery      Call your surgeon if there is any change in your health. This includes signs of a cold or flu (such as a sore throat, runny nose, cough, rash or fever).    Do not smoke, drink alcohol or take over the counter medicine (unless your surgeon or primary care doctor tells you to) for the 24 hours before and after surgery.    If you take prescribed drugs: Follow your doctor s orders about which medicines to take and which to stop until after surgery.    Eating and drinking prior to surgery: follow the instructions from your surgeon    Take a shower or bath the night before surgery. Use the soap your surgeon gave you to gently clean your skin. If you do not have soap from your surgeon, use your regular soap. Do not shave or scrub the surgery site.  Wear clean pajamas and have clean sheets on your bed.   
Airway patent, Nasal mucosa clear. Mouth with normal mucosa. Throat has no vesicles, no oropharyngeal exudates and uvula is midline.
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
## 2021-05-22 NOTE — ED ADULT NURSE NOTE - CHPI ED NUR SYMPTOMS NEG
no acting out behaviors/no back pain/no difficulty bearing weight/no disorientation/no dizziness/no headache/no loss of consciousness/no neck tenderness/no sleeping issues/no pain

## 2021-05-22 NOTE — ED PROVIDER NOTE - PROGRESS NOTE DETAILS
Scribe FANNY for ED attending, Dr. Huerta: Patient declining Workup, declining sutures for finger requesting Dermabond, bandage and leave the department.

## 2021-09-01 ENCOUNTER — APPOINTMENT (OUTPATIENT)
Dept: HEPATOLOGY | Facility: CLINIC | Age: 31
End: 2021-09-01

## 2021-10-07 NOTE — ED ADULT TRIAGE NOTE - MEANS OF ARRIVAL
MD at beside. Made aware of pts elevated BP and the patients inability to stop coughing. MD made the decision to cancel surgery. ambulatory

## 2021-10-28 ENCOUNTER — APPOINTMENT (OUTPATIENT)
Dept: ORTHOPEDIC SURGERY | Facility: CLINIC | Age: 31
End: 2021-10-28
Payer: MEDICAID

## 2021-10-28 VITALS
HEIGHT: 70 IN | BODY MASS INDEX: 31.5 KG/M2 | DIASTOLIC BLOOD PRESSURE: 68 MMHG | TEMPERATURE: 98.1 F | WEIGHT: 220 LBS | SYSTOLIC BLOOD PRESSURE: 133 MMHG | HEART RATE: 71 BPM

## 2021-10-28 DIAGNOSIS — Z83.49 FAMILY HISTORY OF OTHER ENDOCRINE, NUTRITIONAL AND METABOLIC DISEASES: ICD-10-CM

## 2021-10-28 DIAGNOSIS — Z80.9 FAMILY HISTORY OF MALIGNANT NEOPLASM, UNSPECIFIED: ICD-10-CM

## 2021-10-28 DIAGNOSIS — Z87.39 PERSONAL HISTORY OF OTHER DISEASES OF THE MUSCULOSKELETAL SYSTEM AND CONNECTIVE TISSUE: ICD-10-CM

## 2021-10-28 DIAGNOSIS — Z86.59 PERSONAL HISTORY OF OTHER MENTAL AND BEHAVIORAL DISORDERS: ICD-10-CM

## 2021-10-28 PROCEDURE — 24650 CLTX RDL HEAD/NCK FX WO MNPJ: CPT | Mod: LT

## 2021-10-28 PROCEDURE — 73080 X-RAY EXAM OF ELBOW: CPT | Mod: LT

## 2021-10-28 PROCEDURE — 99203 OFFICE O/P NEW LOW 30 MIN: CPT | Mod: 57

## 2021-10-28 RX ORDER — MELOXICAM 15 MG/1
15 TABLET ORAL
Qty: 21 | Refills: 0 | Status: ACTIVE | COMMUNITY
Start: 2021-10-28 | End: 1900-01-01

## 2021-11-05 ENCOUNTER — APPOINTMENT (OUTPATIENT)
Dept: ORTHOPEDIC SURGERY | Facility: CLINIC | Age: 31
End: 2021-11-05

## 2021-11-05 DIAGNOSIS — S52.123A DISPLACED FRACTURE OF HEAD OF UNSPECIFIED RADIUS, INITIAL ENCOUNTER FOR CLOSED FRACTURE: ICD-10-CM

## 2022-01-31 ENCOUNTER — INPATIENT (INPATIENT)
Facility: HOSPITAL | Age: 32
LOS: 0 days | Discharge: LEFT BEFORE DISCHARGE | DRG: 603 | End: 2022-02-01
Attending: INTERNAL MEDICINE | Admitting: INTERNAL MEDICINE
Payer: MEDICAID

## 2022-01-31 VITALS
WEIGHT: 190.04 LBS | TEMPERATURE: 99 F | SYSTOLIC BLOOD PRESSURE: 100 MMHG | HEIGHT: 69 IN | RESPIRATION RATE: 18 BRPM | DIASTOLIC BLOOD PRESSURE: 64 MMHG | HEART RATE: 89 BPM | OXYGEN SATURATION: 98 %

## 2022-01-31 DIAGNOSIS — L03.90 CELLULITIS, UNSPECIFIED: ICD-10-CM

## 2022-01-31 LAB
ALBUMIN SERPL ELPH-MCNC: 3.8 G/DL — SIGNIFICANT CHANGE UP (ref 3.3–5.2)
ALP SERPL-CCNC: 107 U/L — SIGNIFICANT CHANGE UP (ref 40–120)
ALT FLD-CCNC: 31 U/L — SIGNIFICANT CHANGE UP
ANION GAP SERPL CALC-SCNC: 12 MMOL/L — SIGNIFICANT CHANGE UP (ref 5–17)
APTT BLD: 29.7 SEC — SIGNIFICANT CHANGE UP (ref 27.5–35.5)
AST SERPL-CCNC: 26 U/L — SIGNIFICANT CHANGE UP
BASOPHILS # BLD AUTO: 0.03 K/UL — SIGNIFICANT CHANGE UP (ref 0–0.2)
BASOPHILS NFR BLD AUTO: 0.3 % — SIGNIFICANT CHANGE UP (ref 0–2)
BILIRUB SERPL-MCNC: 0.4 MG/DL — SIGNIFICANT CHANGE UP (ref 0.4–2)
BUN SERPL-MCNC: 8.1 MG/DL — SIGNIFICANT CHANGE UP (ref 8–20)
CALCIUM SERPL-MCNC: 9 MG/DL — SIGNIFICANT CHANGE UP (ref 8.6–10.2)
CHLORIDE SERPL-SCNC: 100 MMOL/L — SIGNIFICANT CHANGE UP (ref 98–107)
CO2 SERPL-SCNC: 27 MMOL/L — SIGNIFICANT CHANGE UP (ref 22–29)
CREAT SERPL-MCNC: 1 MG/DL — SIGNIFICANT CHANGE UP (ref 0.5–1.3)
EOSINOPHIL # BLD AUTO: 0.02 K/UL — SIGNIFICANT CHANGE UP (ref 0–0.5)
EOSINOPHIL NFR BLD AUTO: 0.2 % — SIGNIFICANT CHANGE UP (ref 0–6)
GLUCOSE SERPL-MCNC: 96 MG/DL — SIGNIFICANT CHANGE UP (ref 70–99)
HCT VFR BLD CALC: 39.6 % — SIGNIFICANT CHANGE UP (ref 39–50)
HGB BLD-MCNC: 13.2 G/DL — SIGNIFICANT CHANGE UP (ref 13–17)
IMM GRANULOCYTES NFR BLD AUTO: 0.4 % — SIGNIFICANT CHANGE UP (ref 0–1.5)
INR BLD: 1.23 RATIO — HIGH (ref 0.88–1.16)
LYMPHOCYTES # BLD AUTO: 1.86 K/UL — SIGNIFICANT CHANGE UP (ref 1–3.3)
LYMPHOCYTES # BLD AUTO: 18.4 % — SIGNIFICANT CHANGE UP (ref 13–44)
MCHC RBC-ENTMCNC: 30.6 PG — SIGNIFICANT CHANGE UP (ref 27–34)
MCHC RBC-ENTMCNC: 33.3 GM/DL — SIGNIFICANT CHANGE UP (ref 32–36)
MCV RBC AUTO: 91.9 FL — SIGNIFICANT CHANGE UP (ref 80–100)
MONOCYTES # BLD AUTO: 0.98 K/UL — HIGH (ref 0–0.9)
MONOCYTES NFR BLD AUTO: 9.7 % — SIGNIFICANT CHANGE UP (ref 2–14)
NEUTROPHILS # BLD AUTO: 7.18 K/UL — SIGNIFICANT CHANGE UP (ref 1.8–7.4)
NEUTROPHILS NFR BLD AUTO: 71 % — SIGNIFICANT CHANGE UP (ref 43–77)
PLATELET # BLD AUTO: 157 K/UL — SIGNIFICANT CHANGE UP (ref 150–400)
POTASSIUM SERPL-MCNC: 3.9 MMOL/L — SIGNIFICANT CHANGE UP (ref 3.5–5.3)
POTASSIUM SERPL-SCNC: 3.9 MMOL/L — SIGNIFICANT CHANGE UP (ref 3.5–5.3)
PROT SERPL-MCNC: 7 G/DL — SIGNIFICANT CHANGE UP (ref 6.6–8.7)
PROTHROM AB SERPL-ACNC: 14.1 SEC — HIGH (ref 10.6–13.6)
RAPID RVP RESULT: SIGNIFICANT CHANGE UP
RBC # BLD: 4.31 M/UL — SIGNIFICANT CHANGE UP (ref 4.2–5.8)
RBC # FLD: 12 % — SIGNIFICANT CHANGE UP (ref 10.3–14.5)
SARS-COV-2 RNA SPEC QL NAA+PROBE: SIGNIFICANT CHANGE UP
SODIUM SERPL-SCNC: 138 MMOL/L — SIGNIFICANT CHANGE UP (ref 135–145)
WBC # BLD: 10.11 K/UL — SIGNIFICANT CHANGE UP (ref 3.8–10.5)
WBC # FLD AUTO: 10.11 K/UL — SIGNIFICANT CHANGE UP (ref 3.8–10.5)

## 2022-01-31 PROCEDURE — 99285 EMERGENCY DEPT VISIT HI MDM: CPT

## 2022-01-31 PROCEDURE — 73130 X-RAY EXAM OF HAND: CPT | Mod: 26,RT

## 2022-01-31 PROCEDURE — 99223 1ST HOSP IP/OBS HIGH 75: CPT

## 2022-01-31 RX ORDER — VANCOMYCIN HCL 1 G
750 VIAL (EA) INTRAVENOUS EVERY 12 HOURS
Refills: 0 | Status: DISCONTINUED | OUTPATIENT
Start: 2022-01-31 | End: 2022-01-31

## 2022-01-31 RX ORDER — METHADONE HYDROCHLORIDE 40 MG/1
10 TABLET ORAL
Qty: 0 | Refills: 0 | DISCHARGE

## 2022-01-31 RX ORDER — VANCOMYCIN HCL 1 G
1000 VIAL (EA) INTRAVENOUS EVERY 8 HOURS
Refills: 0 | Status: DISCONTINUED | OUTPATIENT
Start: 2022-01-31 | End: 2022-02-01

## 2022-01-31 RX ORDER — OXYCODONE AND ACETAMINOPHEN 5; 325 MG/1; MG/1
1 TABLET ORAL EVERY 4 HOURS
Refills: 0 | Status: DISCONTINUED | OUTPATIENT
Start: 2022-01-31 | End: 2022-02-01

## 2022-01-31 RX ORDER — FAMOTIDINE 10 MG/ML
20 INJECTION INTRAVENOUS
Refills: 0 | Status: DISCONTINUED | OUTPATIENT
Start: 2022-01-31 | End: 2022-02-01

## 2022-01-31 RX ORDER — METHADONE HYDROCHLORIDE 40 MG/1
100 TABLET ORAL ONCE
Refills: 0 | Status: DISCONTINUED | OUTPATIENT
Start: 2022-01-31 | End: 2022-01-31

## 2022-01-31 RX ORDER — IBUPROFEN 200 MG
400 TABLET ORAL EVERY 8 HOURS
Refills: 0 | Status: DISCONTINUED | OUTPATIENT
Start: 2022-01-31 | End: 2022-02-01

## 2022-01-31 RX ORDER — OXYCODONE AND ACETAMINOPHEN 5; 325 MG/1; MG/1
2 TABLET ORAL EVERY 4 HOURS
Refills: 0 | Status: DISCONTINUED | OUTPATIENT
Start: 2022-01-31 | End: 2022-02-01

## 2022-01-31 RX ORDER — BUPRENORPHINE AND NALOXONE 2; .5 MG/1; MG/1
0 TABLET SUBLINGUAL
Qty: 0 | Refills: 0 | DISCHARGE

## 2022-01-31 RX ORDER — PIPERACILLIN AND TAZOBACTAM 4; .5 G/20ML; G/20ML
3.38 INJECTION, POWDER, LYOPHILIZED, FOR SOLUTION INTRAVENOUS EVERY 8 HOURS
Refills: 0 | Status: DISCONTINUED | OUTPATIENT
Start: 2022-01-31 | End: 2022-02-01

## 2022-01-31 RX ORDER — PIPERACILLIN AND TAZOBACTAM 4; .5 G/20ML; G/20ML
3.38 INJECTION, POWDER, LYOPHILIZED, FOR SOLUTION INTRAVENOUS ONCE
Refills: 0 | Status: COMPLETED | OUTPATIENT
Start: 2022-01-31 | End: 2022-01-31

## 2022-01-31 RX ORDER — METHADONE HYDROCHLORIDE 40 MG/1
100 TABLET ORAL DAILY
Refills: 0 | Status: DISCONTINUED | OUTPATIENT
Start: 2022-02-01 | End: 2022-02-01

## 2022-01-31 RX ORDER — VANCOMYCIN HCL 1 G
1000 VIAL (EA) INTRAVENOUS ONCE
Refills: 0 | Status: COMPLETED | OUTPATIENT
Start: 2022-01-31 | End: 2022-01-31

## 2022-01-31 RX ADMIN — Medication 250 MILLIGRAM(S): at 16:46

## 2022-01-31 RX ADMIN — PIPERACILLIN AND TAZOBACTAM 3.38 GRAM(S): 4; .5 INJECTION, POWDER, LYOPHILIZED, FOR SOLUTION INTRAVENOUS at 15:40

## 2022-01-31 RX ADMIN — METHADONE HYDROCHLORIDE 100 MILLIGRAM(S): 40 TABLET ORAL at 17:59

## 2022-01-31 RX ADMIN — OXYCODONE AND ACETAMINOPHEN 2 TABLET(S): 5; 325 TABLET ORAL at 18:56

## 2022-01-31 RX ADMIN — PIPERACILLIN AND TAZOBACTAM 200 GRAM(S): 4; .5 INJECTION, POWDER, LYOPHILIZED, FOR SOLUTION INTRAVENOUS at 15:17

## 2022-01-31 RX ADMIN — PIPERACILLIN AND TAZOBACTAM 25 GRAM(S): 4; .5 INJECTION, POWDER, LYOPHILIZED, FOR SOLUTION INTRAVENOUS at 21:50

## 2022-01-31 RX ADMIN — Medication 400 MILLIGRAM(S): at 21:50
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
## 2022-01-31 NOTE — ED PROVIDER NOTE - RESPIRATORY, MLM
Please notify your doctor prior to filling your prescription, as you are already on chronic pain medication and they may not continue to fill your medication if you get other pain medication from another doctor. Breath sounds clear and equal bilaterally.

## 2022-01-31 NOTE — H&P ADULT - NSHPPHYSICALEXAM_GEN_ALL_CORE
Vital Signs Last 24 Hrs  T(C): 37.1 (31 Jan 2022 17:03), Max: 37.2 (31 Jan 2022 11:48)  T(F): 98.8 (31 Jan 2022 17:03), Max: 98.9 (31 Jan 2022 11:48)  HR: 88 (31 Jan 2022 17:03) (88 - 89)  BP: 127/63 (31 Jan 2022 17:03) (100/64 - 127/63)  BP(mean): --  RR: 18 (31 Jan 2022 17:03) (18 - 18)  SpO2: 99% (31 Jan 2022 17:03) (98% - 99%)     General: NAD,    ENMT: no conjunctival injection, moist oral mucoses, good dentition   Neck and Lymph: no palpable masses in thyroids, no JVD, no lymphadenopathy   Lungs: good air entry b/l, no wheezing/rails/crackles on auscultation, no stridor  CVS: RRR, no M/R/G, no peripheral edema, palpable pedal pulses +2  Abdomen: soft, not distended  Extremities: no apparent deformities, preserved ROM  Skin: warm, dry, no rashes,  Neuro: OAX3, didn't noted CN abnormalities during my exam, observed moving all 4 ext against gravity and cooperating with physical exam, no apparent loss of sensation.   Pschyc; appropriate thought process, mood, response Vital Signs Last 24 Hrs  T(C): 37.1 (31 Jan 2022 17:03), Max: 37.2 (31 Jan 2022 11:48)  T(F): 98.8 (31 Jan 2022 17:03), Max: 98.9 (31 Jan 2022 11:48)  HR: 88 (31 Jan 2022 17:03) (88 - 89)  BP: 127/63 (31 Jan 2022 17:03) (100/64 - 127/63)  BP(mean): --  RR: 18 (31 Jan 2022 17:03) (18 - 18)  SpO2: 99% (31 Jan 2022 17:03) (98% - 99%)     General: NAD,    ENMT: no conjunctival injection, moist oral mucoses, good dentition   Neck and Lymph: no palpable masses in thyroids, no JVD, no lymphadenopathy   Lungs: good air entry b/l, no wheezing/rails/crackles on auscultation, no stridor  CVS: RRR, no M/R/G, no peripheral edema, palpable pedal pulses +2  Abdomen: soft, not distended  Extremities: no apparent deformities, preserved ROM  Skin: 3rd and 4 th finger are tender with swelling and erythema spreading to dorsal surface on R  hand and wrist;    Neuro: OAX3, didn't noted CN abnormalities during my exam, observed moving all 4 ext against gravity and cooperating with physical exam, no apparent loss of sensation.   Pschyc; appropriate thought process, mood, response

## 2022-01-31 NOTE — H&P ADULT - NSHPLABSRESULTS_GEN_ALL_CORE
LABS:                        13.2   10.11 )-----------( 157      ( 31 Jan 2022 15:04 )             39.6     01-31    138  |  100  |  8.1  ----------------------------<  96  3.9   |  27.0  |  1.00    Ca    9.0      31 Jan 2022 15:04    TPro  7.0  /  Alb  3.8  /  TBili  0.4  /  DBili  x   /  AST  26  /  ALT  31  /  AlkPhos  107  01-31    PT/INR - ( 31 Jan 2022 15:41 )   PT: 14.1 sec;   INR: 1.23 ratio         PTT - ( 31 Jan 2022 15:41 )  PTT:29.7 sec          CAPILLARY BLOOD GLUCOSE            RADIOLOGY & ADDITIONAL TESTS:  Results Reviewed:   Imaging Personally Reviewed: none available for review  Electrocardiogram Personally Reviewed: none available for review

## 2022-01-31 NOTE — ED PROCEDURE NOTE - ATTENDING CONTRIBUTION TO CARE
I, Dre Aleman, performed the initial face to face bedside interview with this patient regarding history of present illness, review of symptoms and relevant past medical, social and family history.  I completed an independent physical examination.  I was the initial provider who evaluated this patient. I have signed out the follow up of any pending tests (i.e. labs, radiological studies) to the ACP.  I have communicated the patient’s plan of care and disposition with the ACP. I, Dre Aleman, performed the initial face to face bedside interview with this patient regarding history of present illness, review of symptoms and relevant past medical, social and family history.  I completed an independent physical examination.  I was the initial provider who evaluated this patient. I have signed out the follow up of any pending tests (i.e. labs, radiological studies) to the resident.  I have communicated the patient’s plan of care and disposition with the resident.

## 2022-01-31 NOTE — ED STATDOCS - NS_ ATTENDINGSCRIBEDETAILS _ED_A_ED_FT
I, Flavio Moody, performed the initial face to face bedside interview with this patient regarding history of present illness, review of symptoms and relevant past medical, social and family history.  I completed an independent physical examination.    The history, relevant review of systems, past medical and surgical history, medical decision making, and physical examination was documented by the scribe in my presence and I attest to the accuracy of the documentation.

## 2022-01-31 NOTE — ED ADULT NURSE REASSESSMENT NOTE - NS ED NURSE REASSESS COMMENT FT1
Pt resting, no distress noted, patient complains of right hand pain 7/10 will administer PRN pain medication, and continue to monitor.
Assumed care of pt at 19:15 as stated in report from RN. Charting as noted. Patient A&O x3, right hand pain/discomfort, denies CP/SOB. Updated on the plan of care. Call bell within reach, bed locked in lowest position. IV site flushed w/ NS. No redness, swelling or pain noted to site. No signs of acute distress noted, safety maintained.

## 2022-01-31 NOTE — ED PROVIDER NOTE - MUSCULOSKELETAL NEGATIVE STATEMENT, MLM
no back pain, no musculoskeletal pain, no neck pain, and no weakness. R hand pain, decreased ROM, edema

## 2022-01-31 NOTE — H&P ADULT - HISTORY OF PRESENT ILLNESS
Mr. Campo is 32 y.o M with pmh significant for hx of IVDA in rehabilitation with methadone who presents to ED with swollen R hand for 3 days.   HPI obtained from pt, who reported he had mechanical fall and landed on his hand and sustained abrasion over dorsal surface of R hand and fingers, which has been healing but after shoveling snow last few days he noted slowly increasing swelling and erythema over 3rd and 4 th finger spreading to wrist a/w pain to the point when pt could tolerate pain bhavani more and could not make a fist therefore presented to ED.   In ED, pt is vitally stable, with unrevealing cbc, bmp, and physical exam consistent with R hand cellulitis. Ortho/Hand surgery was consulted and assessed pt, no surgical intevention offered but it was recommened to admit pt for iv Abx. Pt was started on zosyn and vanco in ED.

## 2022-01-31 NOTE — ED PROVIDER NOTE - CLINICAL SUMMARY MEDICAL DECISION MAKING FREE TEXT BOX
Patient is a 31 yo male with PMHx heroin abuse on methadone presenting with R hand pain, erythema, and edema. As per patient he fell on ice 3 days ago and injured his middle finger and index finger with superficial abrasions, pain, and decreased ROM. However he did not have any injuries/swelling/erythema of the rest of his hand. After shoveling snow 2 days ago patient's entire hand started swelling significantly with erythematous streaks ranging from his middle and index fingers to his wrist. Around the same time patient developed decreased ROM to his entire hand. Patient currently complaining of 10/10 pain at the R hand; he last went to his methadone clinic on January 28th and received 100 mg, confirmed by telephone with [facility] ([phone]). Hand xray to r/o fractures vs. foreign body. cbc, cmp, blood cultures sent to evaluate for infection and electrolyte abnormalities. vanc/zosyn for septic arthritis vs. flexor tenosynovitis vs. cellulitis. Will continue to monitor

## 2022-01-31 NOTE — ED ADULT NURSE NOTE - OBJECTIVE STATEMENT
Patient A & O x 3 complaining of right hand pain. Patient states 3 days ago he was running and tripped on ice. Patient's right hand has lacerations on the top of his hand, the skin is red, and swollen. Patient denies SOB, dizziness, nausea, but complains of 10/10 right hand pain and chills.

## 2022-01-31 NOTE — ED PROVIDER NOTE - ATTENDING CONTRIBUTION TO CARE
The patient seen and examined    Cellulitis    I, Dre Aleman, performed the initial face to face bedside interview with this patient regarding history of present illness, review of symptoms and relevant past medical, social and family history.  I completed an independent physical examination.  I was the initial provider who evaluated this patient. I have signed out the follow up of any pending tests (i.e. labs, radiological studies) to the resident.  I have communicated the patient’s plan of care and disposition with the resident.

## 2022-01-31 NOTE — CONSULT NOTE ADULT - SUBJECTIVE AND OBJECTIVE BOX
Pt Name: KENRICK BRANHAM    MRN: [id number]      Patient is a 32y Male presenting to the emergency department with right hand swelling for 3 days. Patient reports 3 days ago he slipped and fell on ice sustaining several superficial  abrasions to the dorsum of his right hand.  He also said he may have jammed his fingers 3&4th.  He started noticing some pain yesterday while he was out shoveling out his car.  Today when he woke he noticed increased pain, swelling and redness to his middle finger and presents to [facility]. states he was draining pus.     REVIEW OF SYSTEMS    General: Alert, responsive, in NAD    Skin/Breast: No rashes, no pruritis   	  Ophthalmologic: No visual changes. No redness.   	  ENMT:	No discharge. No swelling.    Respiratory and Thorax: No difficulty breathing. No cough.  	   Cardiovascular:	No chest pain. No palpitations.    Gastrointestinal:	 No abdominal pain. No diarrhea.     Genitourinary: No dysuria. No bleeding.    Musculoskeletal: SEE HPI.    Neurological: No sensory or motor changes.     Psychiatric: No anxiety or depression.    Hematology/Lymphatics: No swelling.    Endocrine: No Hx of diabetes.    ROS is otherwise negative.    PAST MEDICAL & SURGICAL HISTORY:  PAST MEDICAL & SURGICAL HISTORY:  Heroin abuse    No significant past surgical history        Allergies: No Known Allergies      Medications: methadone    Tablet 100 milliGRAM(s) Oral Once      FAMILY HISTORY:  : non-contributory    Social History:     Ambulation: Walking independently                           13.2   10.11 )-----------( 157      ( 31 Jan 2022 15:04 )             39.6       01-31    138  |  100  |  8.1  ----------------------------<  96  3.9   |  27.0  |  1.00    Ca    9.0      31 Jan 2022 15:04    TPro  7.0  /  Alb  3.8  /  TBili  0.4  /  DBili  x   /  AST  26  /  ALT  31  /  AlkPhos  107  01-31      Vital Signs Last 24 Hrs  T(C): 37.1 (31 Jan 2022 17:03), Max: 37.2 (31 Jan 2022 11:48)  T(F): 98.8 (31 Jan 2022 17:03), Max: 98.9 (31 Jan 2022 11:48)  HR: 88 (31 Jan 2022 17:03) (88 - 89)  BP: 127/63 (31 Jan 2022 17:03) (100/64 - 127/63)  BP(mean): --  RR: 18 (31 Jan 2022 17:03) (18 - 18)  SpO2: 99% (31 Jan 2022 17:03) (98% - 99%)    Daily Height in cm: 175.26 (31 Jan 2022 11:48)    Daily       PHYSICAL EXAM:      Appearance: Alert, responsive, in no acute distress.    Neurological: Sensation is grossly intact to light touch. 5/5 motor function of all extremities. No focal deficits or weaknesses found.    Skin: right hand dorsum, multiple superficial abrasions noted .  3rd digit escar noted over DIP .  No active drainiage noted at this time.  Unable to express any purulence. Patient is un over 3rd digit, 4 th digit superficial abrasion noted at PIP jointable to make a fist, with limited ROM of 3rd digit secondary to pain and swelling. Sensation remains intact. Cellulitis noted      Vascular: 2+ distal pulses. Cap refill < 2 sec. No signs of venous insufficiency or stasis. No extremity ulcerations. No cyanosis.    Musculoskeletal:       Right Upper Extremity:   right hand dorsum, multiple superficial abrasions noted .  3rd digit escar noted over DIP .  No active drainiage noted at this time.  Unable to express any purulence. Patient is un over 3rd digit, 4 th digit superficial abrasion noted at PIP unable to make a fist, with limited ROM of 3rd digit secondary to pain and swelling. Sensation remains intact. Cellulitis noted at 3rd digit        Imaging Studies:  no report at this time. no fx noted     PLAN:   *continue antibiotics   elevation of right hand  will Discuss with Dr Mondragon Pt Name: KENRICK BRANHAM    MRN: [id number]      Patient is a 32y Male presenting to the emergency department with right hand swelling for 3 days. Patient reports 3 days ago he slipped and fell on ice sustaining several superficial  abrasions to the dorsum of his right hand.  He also said he may have jammed his fingers 3&4th.  He started noticing some pain yesterday while he was out shoveling out his car.  Today when he woke he noticed increased pain, swelling and redness to his middle finger and presents to [facility]. states he was draining pus.     REVIEW OF SYSTEMS    General: Alert, responsive, in NAD    Skin/Breast: No rashes, no pruritis   	  Ophthalmologic: No visual changes. No redness.   	  ENMT:	No discharge. No swelling.    Respiratory and Thorax: No difficulty breathing. No cough.  	   Cardiovascular:	No chest pain. No palpitations.    Gastrointestinal:	 No abdominal pain. No diarrhea.     Genitourinary: No dysuria. No bleeding.    Musculoskeletal: SEE HPI.    Neurological: No sensory or motor changes.     Psychiatric: No anxiety or depression.    Hematology/Lymphatics: No swelling.    Endocrine: No Hx of diabetes.    ROS is otherwise negative.    PAST MEDICAL & SURGICAL HISTORY:  PAST MEDICAL & SURGICAL HISTORY:  Heroin abuse    No significant past surgical history        Allergies: No Known Allergies      Medications: methadone    Tablet 100 milliGRAM(s) Oral Once      FAMILY HISTORY:  : non-contributory    Social History:     Ambulation: Walking independently                           13.2   10.11 )-----------( 157      ( 31 Jan 2022 15:04 )             39.6       01-31    138  |  100  |  8.1  ----------------------------<  96  3.9   |  27.0  |  1.00    Ca    9.0      31 Jan 2022 15:04    TPro  7.0  /  Alb  3.8  /  TBili  0.4  /  DBili  x   /  AST  26  /  ALT  31  /  AlkPhos  107  01-31      Vital Signs Last 24 Hrs  T(C): 37.1 (31 Jan 2022 17:03), Max: 37.2 (31 Jan 2022 11:48)  T(F): 98.8 (31 Jan 2022 17:03), Max: 98.9 (31 Jan 2022 11:48)  HR: 88 (31 Jan 2022 17:03) (88 - 89)  BP: 127/63 (31 Jan 2022 17:03) (100/64 - 127/63)  BP(mean): --  RR: 18 (31 Jan 2022 17:03) (18 - 18)  SpO2: 99% (31 Jan 2022 17:03) (98% - 99%)    Daily Height in cm: 175.26 (31 Jan 2022 11:48)    Daily       PHYSICAL EXAM:      Appearance: Alert, responsive, in no acute distress.    Neurological: Sensation is grossly intact to light touch. 5/5 motor function of all extremities. No focal deficits or weaknesses found.    Skin: right hand dorsum, multiple superficial abrasions noted .  3rd digit escar noted over DIP .  No active drainiage noted at this time.  Unable to express any purulence. Patient is un over 3rd digit, 4 th digit superficial abrasion noted at PIP jointable to make a fist, with limited ROM of 3rd digit secondary to pain and swelling. Sensation remains intact. Cellulitis noted      Vascular: 2+ distal pulses. Cap refill < 2 sec. No signs of venous insufficiency or stasis. No extremity ulcerations. No cyanosis.    Musculoskeletal:       Right Upper Extremity:   right hand dorsum, multiple superficial abrasions noted .  3rd digit escar noted over DIP .  No active drainiage noted at this time.  Unable to express any purulence. Patient is un over 3rd digit, 4 th digit superficial abrasion noted at PIP unable to make a fist, with limited ROM of 3rd digit secondary to pain and swelling. Sensation remains intact. Cellulitis noted at 3rd digit        Imaging Studies:  no report at this time. no fx noted     PLAN:   *continue antibiotics   elevation of right hand  will Discuss with Dr Wilson:    As per discussion with Dr wilson, we will continue IVABX, and place  RUE in splint  we will continue to monitor

## 2022-01-31 NOTE — ED PROVIDER NOTE - OBJECTIVE STATEMENT
Patient is a 31 yo male with PMHx heroin abuse on methadone presenting with R hand pain, erythema, and edema. As per patient he fell on ice 3 days ago and injured his middle finger and index finger with superficial abrasions, pain, and decreased ROM. However he did not have any injuries/swelling/erythema of the rest of his hand. After shoveling snow 2 days ago patient's entire hand started swelling significantly with erythematous streaks ranging from his middle and index fingers to his wrist. Around the same time patient developed decreased ROM to his entire hand. Patient currently complaining of 10/10 pain at the R hand; he last went to his methadone clinic on January 28th and received 100 mg, confirmed by telephone with [facility] ([phone]). Denies fevers, chills, dizziness, lightheadedness, CP, SOB, abdominal pain, HA, back pain, diarrhea, dysuria, hematuria, hematochezia, hematemesis, recent travel, sick contacts, leg swelling.

## 2022-01-31 NOTE — ED PROVIDER NOTE - MUSCULOSKELETAL, MLM
Spine appears normal. entire R hand edematous with erythema extending to the wrist; middle and index (2nd) finger held in flexion; extreme pain with flexion and extension of both fingers as well as with palpation. No obvious deformities appreciated; bruises/abrasions noted on the dorsal aspect of both fingers. Decreased ROM at the DIP, pain with passive extension. nontender axillary lymphadenopathy noted on the R site. Full ROM at the wrists, elbows, shoulders without any pain. Sensation intact. 2+ peripheral pulses, good capillary refill.

## 2022-01-31 NOTE — ED PROVIDER NOTE - PROGRESS NOTE DETAILS
JK - hand xray without evidence of foreign body, subcutaneous air, or fractures. methadone dose confirmed with methadone clinic (100 mg) and given to patient. Currently stable, comfortable, pain under control, VSS, in no apparent distress. Hand surgery consulted, recommended non-op management. Patient admitted to medicine for R hand cellulitis and IV abx.

## 2022-01-31 NOTE — H&P ADULT - ASSESSMENT
Mr. Campo is 32 y.o M with pmh significant for hx of IVDA in rehabilitation with methadone who presents to ED with swollen R hand for 3 days.   HPI obtained from pt, who reported he had mechanical fall and landed on his hand and sustained abrasion over dorsal surface of R hand and fingers, which has been healing but after shoveling snow last few days he noted slowly increasing swelling and erythema over 3rd and 4 th finger spreading to wrist a/w pain to the point when pt could tolerate pain bhavani more and could not make a fist therefore presented to ED.   In ED, pt is vitally stable, with unrevealing cbc, bmp, and physical exam consistent with R hand cellulitis. Ortho/Hand surgery was consulted and assessed pt, no surgical intevention offered but it was recommened to admit pt for iv Abx. Pt was started on zosyn and vanco in ED.       # R hand cellulitis  - pt is not toxic appearing >> admited to med/[facility] bed  - ortho/hand surgery team consult noted  - c/w of Vanco and zosyn, may deescalate to Doxy if blood cx are negative  - MRSA screen swab  - supportive care with ibuprofen 400 tid dagoberto, percocet 5/325 adn 10/625 base on pain scale and famotidine 20 mg bid     # Opioid dependance  - resumed Methadone 100 mg daily   - goes to [facility] [phone]    #DVT ppx - ambulate every shift  # Code - full  # Dispo - eventually d/c home when clinically improves with iv abx in 3 days.

## 2022-01-31 NOTE — ED STATDOCS - PROGRESS NOTE DETAILS
[facility] for ED attending, Dr. Moody. 31 y/o male with PMHx of Heroin abuse on Methadone presents to ED s/p fall. Patient reports right hand s/p slipping on the ice 2 days ago, and had a lot of pain while shoveling snow yesterday. Patient presents with right hand redness, pain, and swelling. Will send to main for further evaluation.

## 2022-02-01 VITALS
OXYGEN SATURATION: 99 % | TEMPERATURE: 98 F | DIASTOLIC BLOOD PRESSURE: 68 MMHG | RESPIRATION RATE: 18 BRPM | HEART RATE: 58 BPM | SYSTOLIC BLOOD PRESSURE: 108 MMHG

## 2022-02-01 PROCEDURE — 73130 X-RAY EXAM OF HAND: CPT

## 2022-02-01 PROCEDURE — 96375 TX/PRO/DX INJ NEW DRUG ADDON: CPT

## 2022-02-01 PROCEDURE — 0225U NFCT DS DNA&RNA 21 SARSCOV2: CPT

## 2022-02-01 PROCEDURE — 96374 THER/PROPH/DIAG INJ IV PUSH: CPT

## 2022-02-01 PROCEDURE — 85610 PROTHROMBIN TIME: CPT

## 2022-02-01 PROCEDURE — 87070 CULTURE OTHR SPECIMN AEROBIC: CPT

## 2022-02-01 PROCEDURE — 87040 BLOOD CULTURE FOR BACTERIA: CPT

## 2022-02-01 PROCEDURE — 80053 COMPREHEN METABOLIC PANEL: CPT

## 2022-02-01 PROCEDURE — 87075 CULTR BACTERIA EXCEPT BLOOD: CPT

## 2022-02-01 PROCEDURE — 85025 COMPLETE CBC W/AUTO DIFF WBC: CPT

## 2022-02-01 PROCEDURE — 85730 THROMBOPLASTIN TIME PARTIAL: CPT

## 2022-02-01 PROCEDURE — 87077 CULTURE AEROBIC IDENTIFY: CPT

## 2022-02-01 PROCEDURE — G0378: CPT

## 2022-02-01 PROCEDURE — 87186 SC STD MICRODIL/AGAR DIL: CPT

## 2022-02-01 PROCEDURE — 36415 COLL VENOUS BLD VENIPUNCTURE: CPT

## 2022-02-01 PROCEDURE — 99285 EMERGENCY DEPT VISIT HI MDM: CPT | Mod: 25

## 2022-02-01 RX ADMIN — Medication 400 MILLIGRAM(S): at 05:22

## 2022-02-01 RX ADMIN — PIPERACILLIN AND TAZOBACTAM 25 GRAM(S): 4; .5 INJECTION, POWDER, LYOPHILIZED, FOR SOLUTION INTRAVENOUS at 05:23

## 2022-02-01 RX ADMIN — Medication 250 MILLIGRAM(S): at 00:07

## 2022-02-01 RX ADMIN — FAMOTIDINE 20 MILLIGRAM(S): 10 INJECTION INTRAVENOUS at 05:23

## 2022-02-01 RX ADMIN — OXYCODONE AND ACETAMINOPHEN 1 TABLET(S): 5; 325 TABLET ORAL at 02:07

## 2022-02-01 RX ADMIN — Medication 250 MILLIGRAM(S): at 08:41

## 2022-02-01 NOTE — CHART NOTE - NSCHARTNOTEFT_GEN_A_CORE
Called by RN due to Patient wanting to sign out AMA. Asked Patient why she wanted to leave, reports " I have to charge my ankle bracelet"    Patient is alert and oriented x3 and has demonstrated capacity to make decisions. I explained at length to the Patient the outcomes of leaving AMA, including worsening of their condition, becoming permanently disabled/in pain/critically ill, or death. I explained the risks, benefits and alternatives to treatment as well as the risks of refusing treatment at this time. The patient was told that the hospital evaluation is incomplete. Despite these efforts, we were unable to convince the pt to stay. I offered to answer any questions and fully addressed concerns and answered any such questions. Patient fully understands what has been explained and answered all questions. Attending aware of above. We’ve made  numerous efforts to prevent the pt from leaving AMA. Patient signed form to sign out AMA and accepts responsibility for any and all results of this decision. The importance of returning to the ED/Hospital if medical condition worsen was emphasized at length.

## 2022-02-01 NOTE — PROGRESS NOTE ADULT - SUBJECTIVE AND OBJECTIVE BOX
Pt Name: KENRICK BRANHAM  MRN: [id number]      Patient is a being followed for right hand swelling, 3rd digit wound infection. Patient seen and examined at bedside with Dr. Mondragon - resting comfortably with volar splint in place. Patient states that he has been doing much better and that pain, redness, and swelling symptoms have been improving since receiving antibiotics. Patient states that the pain and redness is localized around base of the 3rd digit. Patient states that he squeezed his finger earlier this morning and pus shot out of the scab. Patient denies fevers, chills, SOB, numbness, tingling. No other orthopedic complaint.        PAST MEDICAL & SURGICAL HISTORY:  Heroin abuse    No significant past surgical history        Allergies: No Known Allergies      Medications: famotidine    Tablet 20 milliGRAM(s) Oral two times a day  ibuprofen  Tablet. 400 milliGRAM(s) Oral every 8 hours  methadone    Tablet 100 milliGRAM(s) Oral daily  oxycodone    5 mG/acetaminophen 325 mG 1 Tablet(s) Oral every 4 hours PRN  oxycodone    5 mG/acetaminophen 325 mG 2 Tablet(s) Oral every 4 hours PRN  piperacillin/tazobactam IVPB.. 3.375 Gram(s) IV Intermittent every 8 hours  vancomycin  IVPB 1000 milliGRAM(s) IV Intermittent every 8 hours                            13.2   10.11 )-----------( 157      ( 31 Jan 2022 15:04 )             39.6     01-31    138  |  100  |  8.1  ----------------------------<  96  3.9   |  27.0  |  1.00    Ca    9.0      31 Jan 2022 15:04    TPro  7.0  /  Alb  3.8  /  TBili  0.4  /  DBili  x   /  AST  26  /  ALT  31  /  AlkPhos  107  01-31      PHYSICAL EXAM:    Vital Signs Last 24 Hrs  T(C): 36.4 (01 Feb 2022 08:21), Max: 37.2 (31 Jan 2022 11:48)  T(F): 97.6 (01 Feb 2022 08:21), Max: 98.9 (31 Jan 2022 11:48)  HR: 58 (01 Feb 2022 08:21) (56 - 89)  BP: 108/68 (01 Feb 2022 08:21) (100/64 - 127/63)  BP(mean): --  RR: 18 (01 Feb 2022 08:21) (16 - 19)  SpO2: 99% (01 Feb 2022 08:21) (98% - 100%)  Daily Height in cm: 175.26 (31 Jan 2022 11:48)    Daily     Appearance: Alert, responsive, in no acute distress.    Musculoskeletal:       Right Upper Extremity: + volar splint on, ACE dressings clean, dry, intact with no bleeding noted. Dressings and splint removed. +  swelling noted to hand and 3rd digit. + erythema between MCP and PIP of 3rd digit. + scab noted to medial PIP of dorsal 3rd digit with pus noted. + healing scab noted to dorsal 4th digit at PIP. + other small healing abrasions noted on dorsal side of hand.       A/P:  Pt is a  32y Male with right hand swelling, 3rd digit wound infection s/p bedside wound debridement 2/1/22    PLAN:   - Wound debridement of right 3rd digit scab and cleaned with normal saline: xeroform and new dressings applied   - F/u wound cultures of right 3rd digit wound   - Continue antibiotics   - Continue to monitor   - Continue splint as applied    Pt Name: KENRICK BRANHAM  MRN: [id number]      Patient is a being followed for right hand swelling, 3rd digit wound infection. Patient seen and examined at bedside with Dr. Mondragon - resting comfortably with volar splint in place. Patient states that he has been doing much better and that pain, redness, and swelling symptoms have been improving since receiving antibiotics. Patient states that the pain and redness is localized around base of the 3rd digit. Patient states that he squeezed his finger earlier this morning and pus shot out of the scab. Patient denies fevers, chills, SOB, numbness, tingling. No other orthopedic complaint.        PAST MEDICAL & SURGICAL HISTORY:  Heroin abuse    No significant past surgical history        Allergies: No Known Allergies      Medications: famotidine    Tablet 20 milliGRAM(s) Oral two times a day  ibuprofen  Tablet. 400 milliGRAM(s) Oral every 8 hours  methadone    Tablet 100 milliGRAM(s) Oral daily  oxycodone    5 mG/acetaminophen 325 mG 1 Tablet(s) Oral every 4 hours PRN  oxycodone    5 mG/acetaminophen 325 mG 2 Tablet(s) Oral every 4 hours PRN  piperacillin/tazobactam IVPB.. 3.375 Gram(s) IV Intermittent every 8 hours  vancomycin  IVPB 1000 milliGRAM(s) IV Intermittent every 8 hours                            13.2   10.11 )-----------( 157      ( 31 Jan 2022 15:04 )             39.6     01-31    138  |  100  |  8.1  ----------------------------<  96  3.9   |  27.0  |  1.00    Ca    9.0      31 Jan 2022 15:04    TPro  7.0  /  Alb  3.8  /  TBili  0.4  /  DBili  x   /  AST  26  /  ALT  31  /  AlkPhos  107  01-31      PHYSICAL EXAM:    Vital Signs Last 24 Hrs  T(C): 36.4 (01 Feb 2022 08:21), Max: 37.2 (31 Jan 2022 11:48)  T(F): 97.6 (01 Feb 2022 08:21), Max: 98.9 (31 Jan 2022 11:48)  HR: 58 (01 Feb 2022 08:21) (56 - 89)  BP: 108/68 (01 Feb 2022 08:21) (100/64 - 127/63)  BP(mean): --  RR: 18 (01 Feb 2022 08:21) (16 - 19)  SpO2: 99% (01 Feb 2022 08:21) (98% - 100%)  Daily Height in cm: 175.26 (31 Jan 2022 11:48)    Daily     Appearance: Alert, responsive, in no acute distress.    Musculoskeletal:       Right Upper Extremity: + volar splint on, ACE dressings clean, dry, intact with no bleeding noted. Dressings and splint removed. +  swelling noted to hand and 3rd digit. + erythema between MCP and PIP of 3rd digit. + scab noted to medial PIP of dorsal 3rd digit with pus noted. + healing scab noted to dorsal 4th digit at PIP. + other small healing abrasions noted on dorsal side of hand. + ROM of digits - 3rd digit limited secondary to swelling. Sensation grossly intact to light touch distally. 2+ radial pulse.       A/P:  Pt is a  32y Male with right hand swelling, 3rd digit wound infection s/p bedside wound debridement 2/1/22    PLAN:   - Wound debridement of right 3rd digit scab and cleaned with normal saline: xeroform and new dressings applied   - F/u wound cultures of right 3rd digit wound   - Continue antibiotics   - Continue to monitor   - Continue splint as applied

## 2022-02-02 RX ORDER — CEPHALEXIN 500 MG
1 CAPSULE ORAL
Qty: 28 | Refills: 0
Start: 2022-02-02 | End: 2022-02-08

## 2022-02-02 RX ORDER — AZTREONAM 2 G
1 VIAL (EA) INJECTION
Qty: 14 | Refills: 0
Start: 2022-02-02 | End: 2022-02-08

## 2022-02-02 NOTE — POST DISCHARGE NOTE - RECOMMENDATION:
D/w Dr. Mondragon - sent Bactrim DS and Keflex to patient's listed pharmacy in chart.  Patient to return to [facility] ASAP. Patient agrees with plan and states he will be returning to [facility] ED tonight.

## 2022-02-02 NOTE — POST DISCHARGE NOTE - DETAILS:
Called patient's emergency contact (Ade Campo) [phone] - Patient's mother  She placed patient on the phone. I told patient that it was very important to come back to the hospital for appropriate treatment of his finger infection. I discussed with him that his cultures were positive for strep and staph bacteria and could potentially result in sepsis and possible death if not treated as soon as possible. Patient agrees and plans to come back to the hospital tonight. I discussed with patient that if for some reason he is unable to come back to the hospital I will be sending two antibiotics to his pharmacy keflex and Bactrim however this is ONLY if he decides not to come back to the hospital. This was sent due to his history of leaving AMA at previous visit. I expressed to him several times the importance he come back to hospital for appropriate treatment and monitoring of his infection. Patient seems to be of sound mind and seems to have good understanding of plan. D/w Dr. Avery Patient called twice [phone]. No answer. Message left instructing patient it is imperative that he call doctor An's Office to discuss lab results and treatment plan regarding recent visit.

## 2022-02-02 NOTE — POST DISCHARGE NOTE - ADDITIONAL DOCUMENTATION:
For yesterday: 8:10 am on 2/1/22  Pt was seen and examined yesterday at 8:10 am.  Bedside right hand debridement for infection.  Continue iv abx.    For today:   Culture positive for Strep and Staph, Suggest continue oral abx  Our office called him but no answer.  Will try to contact him for his f/u in our [place] office on Saturday 2/5/22  Continue splinting. For yesterday: 8:10 am on 2/1/22  Pt was seen and examined yesterday at 8:10 am.  Bedside right hand debridement for infection (Procedure note done on my office note from the day).  Continue iv abx.    For today:   Culture positive for Strep and Staph, Suggest continue oral abx  Our office called him but no answer.  Will try to contact him for his f/u in our [place] office on Saturday 2/5/22  Continue splinting.

## 2022-02-02 NOTE — POST DISCHARGE NOTE - CALL #1:
[de-identified] : 63 year old right hand dominant male presents to the office for an evaluation of left shoulder pain onset approximately one year ago. He states his symptoms were initially caused by strenuous use of the arm playing basketball. He was last seen on 4/10/18, where he was given the option for surgery, however he deferred at the time. He presents back due to recurrent symptoms. Pain is a 7-8/10, intermittently worse, and has been worsening over the past month. Pain localized to the shoulder/biceps region, worsened by lifting, repetitive activity. It is constant in nature, and worst with overhead activities. He has a recent MRI of the shoulder done on 4/30/19, ordered by Dr. Eleuterio Sal, who also referred him here for re-evaluation.   Attempted left message

## 2022-02-02 NOTE — POST DISCHARGE NOTE - BACKGROUND:
Patient came to [facility] ED 1/31/22 with digital wound infection, cultures taken. Patient left AMA.

## 2022-02-03 ENCOUNTER — EMERGENCY (EMERGENCY)
Facility: HOSPITAL | Age: 32
LOS: 1 days | Discharge: HOME | End: 2022-02-03
Attending: EMERGENCY MEDICINE
Payer: MEDICAID

## 2022-02-03 VITALS
RESPIRATION RATE: 18 BRPM | OXYGEN SATURATION: 97 % | WEIGHT: 190.04 LBS | TEMPERATURE: 98 F | DIASTOLIC BLOOD PRESSURE: 65 MMHG | SYSTOLIC BLOOD PRESSURE: 107 MMHG | HEART RATE: 82 BPM | HEIGHT: 69 IN

## 2022-02-03 PROCEDURE — 96374 THER/PROPH/DIAG INJ IV PUSH: CPT

## 2022-02-03 PROCEDURE — 99284 EMERGENCY DEPT VISIT MOD MDM: CPT

## 2022-02-03 PROCEDURE — 99283 EMERGENCY DEPT VISIT LOW MDM: CPT

## 2022-02-03 PROCEDURE — 99285 EMERGENCY DEPT VISIT HI MDM: CPT | Mod: 25

## 2022-02-03 PROCEDURE — 36415 COLL VENOUS BLD VENIPUNCTURE: CPT

## 2022-02-03 RX ADMIN — Medication 100 MILLIGRAM(S): at 11:59

## 2022-02-03 NOTE — ED STATDOCS - CARE PROVIDER_API CALL
Luda Mondragon)  Orthopaedic Surgery  [phone] [place] [address], [address]  [place] [address]  Phone: [phone]  Fax: [phone]  Follow Up Time: Urgent

## 2022-02-03 NOTE — ED STATDOCS - OBJECTIVE STATEMENT
31 y/o male with no PMHx, c/o wound check. Patient was here on 1/31 for right hand injury s/p fall, swelling and left AMA. Patient's labs came back +cultures for Strep, Staph and was informed to come back to ER for antibiotics. Patient reports on 1/31 feeling feverish, hand swelling was worse and swollen lymph node in right armpit. Patient states that since the visit the swelling has gone down, no longer feeling feverish or having the swollen lymph node. Denies fever. 33 y/o male with no PMHx, c/o wound check. Patient was here on 1/31 for right hand injury s/p fall, swelling and left AMA. Patient's labs came back with wound cultures positive for Strep, Staph and was informed by hand surgery (Dr. Gilbert) to come back to ER for antibiotics. Patient reports on 1/31 feeling feverish, hand swelling was worse and swollen lymph node in right armpit. Patient states that since the visit the swelling has gone down, no longer feeling feverish or having the swollen lymph node. Denies fever.

## 2022-02-03 NOTE — ED STATDOCS - NS ED MD DISPO DISCHARGE
COSTEN, SANDRA 62y Female  MRN-[id number]    Patient is a 62y old  Female who presents with a chief complaint of dysuria, redness of foot (2018 20:22)      HPI:  62 F PMH SLE, systolic HFrEF(EF 32%) s/p AICD, HTN, CKD III- IV, PE / left leg DVT dx , HIT, CAD (no stents), stage IV sacral ulcer, chronic Fox, recurrent pericardial effusion, AAA ~14cm in 2016 s/p repair in  w/ aortoiliac stent complicated post-operatively with development of left lower extremity compartment syndrome, now presents with dysuria and LLE redness.  3-4 d PTA, pt was in bed using an extendable grabber that slipped and hit her leg.  At first she didn't notice anything, then soon realized she had punctured the skin on the medial aspect of her L thigh, with some bleeding.  Starting today, pt has noticed increasing warmth and redness of the L leg; she notes her L leg has always been very large and malformed looking since her prior fasciotomy for compartment syndrome, but the warmth/redness is new and overall is more swollen then usual. +low grade fever to around 100.0 at home; pt took tylenol over last few days and is now afebrile. Denies purulent drainage. Pt also c/o of some urinary changes; she has chronic fox (placed in setting of her nonhealing sacral ulcer) so does not experience dysuria, but has noticed increasing pubic pressure and darkness/cloudiness of urine.  Pt also is c/o R thigh severe pain; extending down her R femur; she notes that her wound vac on her R sacrum was removed today and packed, which exacerbated the pain.  She was already scheduled to get a CT of her hip/leg to evaluate these complaints but has not yet been able to get it as an o/p.  Pt nonambulatory at baseline, but can still stand on her legs. Denies CP/SOB, denies n/v/d.     VS: Tm 98.8, 87, 141/73, 18, 95% RA.  Labs: no leukocytosis, slightly downtrended anemia, coags c/w a/c, cmp showing stable ckd4/5, UA with blood/LE/carmelina neg nitrites.  CXR prelim no emergent findings. Given vanco, cefepime in ER. 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 3.375 every 12 hours      MEDICATIONS  (STANDING):  amLODIPine   Tablet 10 milliGRAM(s) Oral daily  ascorbic acid 500 milliGRAM(s) Oral daily  calcitriol   Capsule 0.25 MICROGram(s) Oral daily  carvedilol 25 milliGRAM(s) Oral every 12 hours  ferrous    sulfate 325 milliGRAM(s) Oral daily  furosemide    Tablet 40 milliGRAM(s) Oral daily  hydrALAZINE 50 milliGRAM(s) Oral three times a day  labetalol 200 milliGRAM(s) Oral three times a day  Nephro-alyse 1 Tablet(s) Oral daily  piperacillin/tazobactam IVPB. 3.375 Gram(s) IV Intermittent every 12 hours  predniSONE   Tablet 10 milliGRAM(s) Oral daily      Social History  Smoking:  Etoh:  Drug use:      FAMILY HISTORY:  No pertinent family history in first degree relatives      Vital Signs Last 24 Hrs  T(C): 37.5 (2018 14:19), Max: 37.5 (2018 07:31)  T(F): 99.5 (2018 14:19), Max: 99.5 (2018 07:31)  HR: 87 (2018 14:19) (79 - 100)  BP: 139/71 (2018 14:19) (129/69 - 149/78)  BP(mean): --  RR: 18 (2018 14:19) (18 - 19)  SpO2: 96% (2018 14:19) (95% - 99%)    CBC Full  -  ( 2018 07:30 )  WBC Count : 9.22 K/uL  Hemoglobin : 8.2 g/dL  Hematocrit : 27.6 %  Platelet Count - Automated : 163 K/uL  Mean Cell Volume : 90.2 fl  Mean Cell Hemoglobin : 26.8 pg  Mean Cell Hemoglobin Concentration : 29.7 gm/dL  Auto Neutrophil # : 7.91 K/uL  Auto Lymphocyte # : 0.61 K/uL  Auto Monocyte # : 0.59 K/uL  Auto Eosinophil # : 0.08 K/uL  Auto Basophil # : 0.00 K/uL  Auto Neutrophil % : 85.8 %  Auto Lymphocyte % : 6.6 %  Auto Monocyte % : 6.4 %  Auto Eosinophil % : 0.9 %  Auto Basophil % : 0.0 %        141  |  105  |  79<H>  ----------------------------<  100<H>  4.4   |  23  |  3.73<H>    Ca    9.0      2018 06:08  Phos  3.7       Mg     2.2         TPro  6.4  /  Alb  3.1<L>  /  TBili  0.4  /  DBili  x   /  AST  10  /  ALT  7<L>  /  AlkPhos  51  19    LIVER FUNCTIONS - ( 2018 06:08 )  Alb: 3.1 g/dL / Pro: 6.4 g/dL / ALK PHOS: 51 U/L / ALT: 7 U/L / AST: 10 U/L / GGT: x           Urinalysis Basic - ( 2018 17:50 )    Color: Yellow / Appearance: SL Turbid / S.012 / pH: x  Gluc: x / Ketone: Negative  / Bili: Negative / Urobili: Negative   Blood: x / Protein: 100 mg/dL / Nitrite: Negative   Leuk Esterase: Large / RBC: 10-25 /HPF / WBC 26-50 /HPF   Sq Epi: x / Non Sq Epi: Few /HPF / Bacteria: Moderate /HPF        MICROBIOLOGY:    Vancomycin Level, Random: 9.8 ug/mL (18 @ 06:08)      RADIOLOGY  CT Femur No Cont, Right (18 @ 22:21) >  1.  No acute fracture or dislocation. No drainable fluid collections   identified in the right thigh.  2.  Decubitus ulcer is identified with air tracking to the posterior   cortex of the right ischium. The ischium is similar in appearance to   prior CT of 2017.  3.  Partially imaged skin thickening and subcutaneous edema in the left   lower extremity.     Xray Chest 1 View AP/PA (18 @ 16:28) >  Persistent obscuration of the left hemidiaphragm may be due to   cardiomegaly, however underlying atelectasis or infection cannot be   definitively ruled out. COSTEN, SANDRA 62y Female  MRN-[id number]    Patient is a 62y old  Female who presents with a chief complaint of dysuria, redness of foot (2018 20:22)      HPI:  62 F PMH SLE, systolic HFrEF(EF 32%) s/p AICD, HTN, CKD III- IV, PE / left leg DVT dx , HIT, CAD (no stents), stage IV sacral ulcer, chronic Fox, recurrent pericardial effusion, AAA ~14cm in 2016 s/p repair in  w/ aortoiliac stent complicated post-operatively with development of left lower extremity compartment syndrome, now presents with dysuria and LLE redness.  3-4 d PTA, pt was in bed using an extendable grabber that slipped and hit her leg.  At first she didn't notice anything, then soon realized she had punctured the skin on the medial aspect of her L thigh, with some bleeding.  Starting today, pt has noticed increasing warmth and redness of the L leg; she notes her L leg has always been very large and malformed looking since her prior fasciotomy for compartment syndrome, but the warmth/redness is new and overall is more swollen then usual. +low grade fever to around 100.0 at home; pt took tylenol over last few days and is now afebrile. Denies purulent drainage. Pt also c/o of some urinary changes; she has chronic fox (placed in setting of her nonhealing sacral ulcer) so does not experience dysuria, but has noticed increasing pubic pressure and darkness/cloudiness of urine.  Pt also is c/o R thigh severe pain; extending down her R femur; she notes that her wound vac on her R sacrum was removed today and packed, which exacerbated the pain.  She was already scheduled to get a CT of her hip/leg to evaluate these complaints but has not yet been able to get it as an o/p.  Pt nonambulatory at baseline, but can still stand on her legs. Denies CP/SOB, denies n/v/d.     VS: Tm 98.8, 87, 141/73, 18, 95% RA.  Labs: no leukocytosis, slightly downtrended anemia, coags c/w a/c, cmp showing stable ckd4/5, UA with blood/LE/carmelina neg nitrites.  CXR prelim no emergent findings. Given vanco, cefepime in ER. (2018 20:22)    Claims to have had fevers 2 days at home. No recent abx use. Denies fall.     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 3.375 every 12 hours      MEDICATIONS  (STANDING):  amLODIPine   Tablet 10 milliGRAM(s) Oral daily  ascorbic acid 500 milliGRAM(s) Oral daily  calcitriol   Capsule 0.25 MICROGram(s) Oral daily  carvedilol 25 milliGRAM(s) Oral every 12 hours  ferrous    sulfate 325 milliGRAM(s) Oral daily  furosemide    Tablet 40 milliGRAM(s) Oral daily  hydrALAZINE 50 milliGRAM(s) Oral three times a day  labetalol 200 milliGRAM(s) Oral three times a day  Nephro-alyse 1 Tablet(s) Oral daily  piperacillin/tazobactam IVPB. 3.375 Gram(s) IV Intermittent every 12 hours  predniSONE   Tablet 10 milliGRAM(s) Oral daily      Social History  Smoking: no  Etoh: no  Drug use: no      FAMILY HISTORY:  No pertinent family history in first degree relatives      Vital Signs Last 24 Hrs  T(C): 37.5 (2018 14:19), Max: 37.5 (2018 07:31)  T(F): 99.5 (2018 14:19), Max: 99.5 (2018 07:31)  HR: 87 (2018 14:19) (79 - 100)  BP: 139/71 (2018 14:19) (129/69 - 149/78)  BP(mean): --  RR: 18 (2018 14:19) (18 - 19)  SpO2: 96% (2018 14:19) (95% - 99%)    CBC Full  -  ( 2018 07:30 )  WBC Count : 9.22 K/uL  Hemoglobin : 8.2 g/dL  Hematocrit : 27.6 %  Platelet Count - Automated : 163 K/uL  Mean Cell Volume : 90.2 fl  Mean Cell Hemoglobin : 26.8 pg  Mean Cell Hemoglobin Concentration : 29.7 gm/dL  Auto Neutrophil # : 7.91 K/uL  Auto Lymphocyte # : 0.61 K/uL  Auto Monocyte # : 0.59 K/uL  Auto Eosinophil # : 0.08 K/uL  Auto Basophil # : 0.00 K/uL  Auto Neutrophil % : 85.8 %  Auto Lymphocyte % : 6.6 %  Auto Monocyte % : 6.4 %  Auto Eosinophil % : 0.9 %  Auto Basophil % : 0.0 %        141  |  105  |  79<H>  ----------------------------<  100<H>  4.4   |  23  |  3.73<H>    Ca    9.0      2018 06:08  Phos  3.7       Mg     2.2         TPro  6.4  /  Alb  3.1<L>  /  TBili  0.4  /  DBili  x   /  AST  10  /  ALT  7<L>  /  AlkPhos  51  -19    LIVER FUNCTIONS - ( 2018 06:08 )  Alb: 3.1 g/dL / Pro: 6.4 g/dL / ALK PHOS: 51 U/L / ALT: 7 U/L / AST: 10 U/L / GGT: x           Urinalysis Basic - ( 2018 17:50 )    Color: Yellow / Appearance: SL Turbid / S.012 / pH: x  Gluc: x / Ketone: Negative  / Bili: Negative / Urobili: Negative   Blood: x / Protein: 100 mg/dL / Nitrite: Negative   Leuk Esterase: Large / RBC: 10-25 /HPF / WBC 26-50 /HPF   Sq Epi: x / Non Sq Epi: Few /HPF / Bacteria: Moderate /HPF        MICROBIOLOGY:    Vancomycin Level, Random: 9.8 ug/mL (18 @ 06:08)      RADIOLOGY  CT Femur No Cont, Right (18 @ 22:21) >  1.  No acute fracture or dislocation. No drainable fluid collections   identified in the right thigh.  2.  Decubitus ulcer is identified with air tracking to the posterior   cortex of the right ischium. The ischium is similar in appearance to   prior CT of 2017.  3.  Partially imaged skin thickening and subcutaneous edema in the left   lower extremity.     Xray Chest 1 View AP/PA (18 @ 16:28) >  Persistent obscuration of the left hemidiaphragm may be due to   cardiomegaly, however underlying atelectasis or infection cannot be   definitively ruled out. Home

## 2022-02-03 NOTE — CONSULT NOTE ADULT - SUBJECTIVE AND OBJECTIVE BOX
Pt Name: KENRICK BRANHAM  MRN: [id number]      Patient is a 32y Male known to Dr. Mondragon presenting to the emergency department with a right 3rd digit wound infection. Patient was recently here on Tuesday, 2/1. Dr. Mondragon performed a 3rd digit scar debridement in which pus was released. Wound cultures were taken. Patient states that he AMA'd later that day because he had to charge his ankle bracelet. Patient states that he received a call from Dr. Mondragon and his office yesterday, 2/2, in which they informed him that he had to come back to the ER because the wound cultures grew back strep and staph. Patient states that Dr. Mondragon sent antibiotics to his pharmacy but has not picked them up yet. Patient states that finger and hand have been getting better but patient admits that he has not been on antibiotics since Tuesday. Patient denies pus, fever/chills, numbness, tingling.        REVIEW OF SYSTEMS  General: Alert, responsive, in NAD  Skin/Breast: No rashes, no pruritis   Respiratory and Thorax: No difficulty breathing. No cough.  Cardiovascular:	No chest pain. No palpitations.  Gastrointestinal:	 No abdominal pain. No diarrhea.   Musculoskeletal: SEE HPI.  Neurological: No sensory or motor changes.   Hematology/Lymphatics: + mild swelling to right 3rd digit.        PAST MEDICAL & SURGICAL HISTORY:  Heroin abuse    No significant past surgical history        Allergies: No Known Allergies      Medications:     FAMILY HISTORY:  No pertinent family history in first degree relatives    : non-contributory    Social History:       Vital Signs Last 24 Hrs  T(C): 36.9 (03 Feb 2022 11:04), Max: 36.9 (03 Feb 2022 11:04)  T(F): 98.4 (03 Feb 2022 11:04), Max: 98.4 (03 Feb 2022 11:04)  HR: 82 (03 Feb 2022 11:04) (82 - 82)  BP: 107/65 (03 Feb 2022 11:04) (107/65 - 107/65)  BP(mean): --  RR: 18 (03 Feb 2022 11:04) (18 - 18)  SpO2: 97% (03 Feb 2022 11:04) (97% - 97%)    Daily Height in cm: 175.26 (03 Feb 2022 11:04)    Daily       PHYSICAL EXAM:  Appearance: Alert, responsive, in no acute distress.  Musculoskeletal:       Right Upper Extremity: + mild swelling noted to hand and 3rd digit - improved. + mild erythema between MCP and PIP of 3rd digit. + scab noted to medial PIP of dorsal 3rd digit - no pus or discharge upon palpation. No fluctuance noted. + healing scab noted to dorsal 4th digit at PIP. + other small healing abrasions noted on dorsal side of hand. + ROM of digits - 3rd digit MCP, PIP, DIP limited secondary to swelling. No tenderness to palpation to rest of hand or 3rd digit. Sensation grossly intact to light touch distally. 2+ radial pulse.       A/P:  Pt is a  32y Male with right 3rd digit wound infection    PLAN:   * Plan discussed with Dr. Mondragon   * Plan discussed with Dr. Mendoza   * Continue oral antibiotics   * Elevation of right hand   * Follow up in office with Dr. Mondragon within a week 
INFECTIOUS DISEASES AND INTERNAL MEDICINE at [place]  =======================================================  Darrick Martell MD  Diplomates American Board of Internal Medicine and Infectious Diseases  Telephone [phone]  Fax            [phone]  =======================================================    KENRICK [id number]      HPI:      PAST MEDICAL & SURGICAL HISTORY:  Heroin abuse    No significant past surgical history        ANTIBIOTICS      Allergies    No Known Allergies    Intolerances        SOCIAL HISTORY:     FAMILY HX   FAMILY HISTORY:  No pertinent family history in first degree relatives        Vital Signs Last 24 Hrs  T(C): 36.9 (03 Feb 2022 11:04), Max: 36.9 (03 Feb 2022 11:04)  T(F): 98.4 (03 Feb 2022 11:04), Max: 98.4 (03 Feb 2022 11:04)  HR: 82 (03 Feb 2022 11:04) (82 - 82)  BP: 107/65 (03 Feb 2022 11:04) (107/65 - 107/65)  BP(mean): --  RR: 18 (03 Feb 2022 11:04) (18 - 18)  SpO2: 97% (03 Feb 2022 11:04) (97% - 97%)  Drug Dosing Weight  Height (cm): 175.3 (03 Feb 2022 11:04)  Weight (kg): 86.2 (03 Feb 2022 11:04)  BMI (kg/m2): 28.1 (03 Feb 2022 11:04)  BSA (m2): 2.02 (03 Feb 2022 11:04)      REVIEW OF SYSTEMS:    CONSTITUTIONAL:  As per HPI.    HEENT:  Eyes:  No diplopia or blurred vision. ENT:  No earache, sore throat or runny nose.    CARDIOVASCULAR:  No pressure, squeezing, strangling, tightness, heaviness or aching about the chest, neck, axilla or epigastrium.    RESPIRATORY:  No cough, shortness of breath, PND or orthopnea.    GASTROINTESTINAL:  No nausea, vomiting or diarrhea.    GENITOURINARY:  No dysuria, frequency or urgency.    MUSCULOSKELETAL:  As per HPI.    SKIN:  No change in skin, hair or nails.    NEUROLOGIC:  No paresthesias, fasciculations, seizures or weakness.                  PHYSICAL EXAMINATION:    GENERAL: The patient is a _____in no apparent distress. ___     VITAL SIGNS: T(C): 36.9 (02-03-22 @ 11:04), Max: 36.9 (02-03-22 @ 11:04)  HR: 82 (02-03-22 @ 11:04) (82 - 82)  BP: 107/65 (02-03-22 @ 11:04) (107/65 - 107/65)  RR: 18 (02-03-22 @ 11:04) (18 - 18)  SpO2: 97% (02-03-22 @ 11:04) (97% - 97%)  Wt(kg): --    HEENT: Head is normocephalic and atraumatic.  ANICTERIC  NECK: Supple. No carotid bruits.  No lymphadenopathy or thyromegaly.    LUNGS:COARSE BREATH SOUNDS    HEART: Regular rate and rhythm without murmur.    ABDOMEN: Soft, nontender, and nondistended.  Positive bowel sounds.  No hepatosplenomegaly was noted. NO REBOUND NO GUARDING    EXTREMITIES RIGHT HAND MID FINGER WITH SCAB MILD ERYTHEMA NO WARMTH OR TENDERNESS NO DRAINAGE     NEUROLOGIC: NON FOCAL      SKIN: No ulceration or induration present. NO RASH        BLOOD CULTURES  Culture Results:   Few Streptococcus pyogenes (Group A)  Penicillin and ampicillin are drugs of choice for  treatment of beta-hemolytic streptococcal infections.  Susceptibility testing is not performed routinely because  S. pyogenes (GAS) is universally susceptible to penicillin  and resistance in other strains is extremely rare. (02-01 @ 12:16)  Culture Results:   Few Staphylococcus aureus Susceptibility to follow. (02-01 @ 12:12)  Culture Results:   No growth at 48 hours (01-31 @ 15:42)  Culture Results:   No growth at 48 hours (01-31 @ 15:36)       URINE CX          LABS:                RADIOLOGY & ADDITIONAL STUDIES:      ASSESSMENT/PLAN    31 y/o male with no PMHx, c/o wound check. Patient was here on 1/31 for right hand injury s/p fall, swelling and left AMA. Patient's labs came back with wound cultures positive for Strep, Staph and was informed by hand surgery (Dr. Gilbert) to come back to ER for antibiotics. Patient reports on 1/31 feeling feverish, hand swelling was worse and swollen lymph node in right armpit. Patient states that since the visit the swelling has gone down, no longer feeling feverish or having the swollen lymph node. Denies feve  PT IS NON TOIXC  WITH SOME ERYTHEMA RIGHT AND AND FINGER WITH ESCHAR NO PUS NO ODOR NO DRAINAGE   OVERALL HAND APPEARS IMPROVED COMPARED TO  PRIOR ER STAY  SEEN IN PHOTOS     I THINK  CAN BE CHANGED TO ORAL ABX  PT WAS GIVEN KEFLEX AND BACTRIM  BUT NEVER PICKED  THEM UP   OK FOR D/C   ON ORAL COMBINATION IF STAPH AUREUS IS FINALIZED AS MRSA T  WILL FOLLOW UP WTIH FURTHER RECOMMENDATIONS [facility] ER DOC AND ORTHO  PA            COLTEN BARRON MD

## 2022-02-03 NOTE — ED STATDOCS - PHYSICAL EXAMINATION
Gen: NAD, AOx3  Head: NCAT  HEENT: PERRL, oral mucosa moist, normal conjunctiva, oropharynx clear without exudate or erythema  Lung: CTAB, no respiratory distress, no wheezing, rales, rhonchi  CV: normal s1/s2, rrr, no murmurs, Normal perfusion, pulses 2+ throughout  Abd: soft, NTND, no CVA tenderness  MSK: No edema, no visible deformities, full range of motion in all 4 extremities, dorsal aspect of right 3rd digit overlying scape, mild erythema/warmth, no drainage or fluctuance.   Neuro: CN II-XII grossly intact, No focal neurologic deficits  Skin: No rash   Psych: normal affect Gen: NAD, AOx3  Head: NCAT  HEENT: oral mucosa moist, normal conjunctiva  Lung: CTAB, no respiratory distress, no wheezing, rales, rhonchi  CV: normal s1/s2, rrr, no murmurs, Normal perfusion  Abd: soft, NTND  MSK: No edema, no visible deformities, full range of motion in all 4 extremities, dorsal aspect of right 3rd digit overlying scape, mild erythema/warmth, no drainage or fluctuance.   Neuro: No focal neurologic deficits  Skin: No rash   Psych: normal affect
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
## 2022-02-03 NOTE — ED STATDOCS - PATIENT PORTAL LINK FT
You can access the FollowMyHealth Patient Portal offered by [facility] by registering at the following website: [email or web address]. By joining [facility]’s FollowMyHealth portal, you will also be able to view your health information using other applications (apps) compatible with our system.

## 2022-02-03 NOTE — ED STATDOCS - ATTENDING CONTRIBUTION TO CARE
I, Denice Garcia, performed a face to face bedside interview with this patient regarding history of present illness, review of symptoms and relevant past medical, social and family history.  I completed an independent physical examination. Medical decision making, follow-up on ordered tests (ie labs, radiologic studies) and re-evaluation of the patient's status has been communicated to the ACP.  Disposition of the patient will be based on test outcome and response to ED interventions.

## 2022-02-03 NOTE — ED STATDOCS - NSFOLLOWUPINSTRUCTIONS_ED_ALL_ED_FT
1. Follow up with your primary care physician within 2-3days for reevaluation.  2.  Return to the Emergency Department for worsening, progressive or any other concerning symptoms.   3.  Complete your entire course of antibiotics as prescribed. Do not stop taking antibiotics even if your symptoms improve.     Cellulitis    Cellulitis is a skin infection caused by bacteria. This condition occurs most often in the arms and lower legs but can occur anywhere over the body. Symptoms include redness, swelling, warm skin, tenderness, and chills/fever. If you were prescribed an antibiotic medicine, take it as told by your health care provider. Do not stop taking the antibiotic even if you start to feel better.    SEEK IMMEDIATE MEDICAL CARE IF YOU HAVE ANY OF THE FOLLOWING SYMPTOMS: worsening fever, red streaks coming from affected area, vomiting or diarrhea, or dizziness/lightheadedness.

## 2022-02-03 NOTE — ED ADULT NURSE NOTE - CHIEF COMPLAINT QUOTE
Pt states left AMA and was receiving IV antibiotics for unknown reason (hand is swollen), states he is back now after taking care of what he had to do. Pt states there is an improvement to his hand pain/swelling/redness.

## 2022-02-03 NOTE — ED STATDOCS - CLINICAL SUMMARY MEDICAL DECISION MAKING FREE TEXT BOX
Patient with right hand cellulitis p/w reevaluation after leaving AMA 2 days ago. Will obtain labs, cultures and reassess. Patient with right hand cellulitis p/w reevaluation after leaving AMA 2 days ago. Will obtain labs, cultures, d/w hand surgery and reassess. Denice Garcia DO

## 2022-02-03 NOTE — ED STATDOCS - PROGRESS NOTE DETAILS
Ortho and ID at bedside. Denice Garcia DO Seen by ortho and ID. Swelling markedly improved from 2 days ago, no streaking, no lymphadenopathy. REcommend dc home with PO antibiotics. Bactrim and Keflex already sent to patient's pharmacy. Will hernando Garcia DO

## 2022-02-04 LAB
-  AMPICILLIN/SULBACTAM: SIGNIFICANT CHANGE UP
-  CEFAZOLIN: SIGNIFICANT CHANGE UP
-  CLINDAMYCIN: SIGNIFICANT CHANGE UP
-  ERYTHROMYCIN: SIGNIFICANT CHANGE UP
-  GENTAMICIN: SIGNIFICANT CHANGE UP
-  OXACILLIN: SIGNIFICANT CHANGE UP
-  RIFAMPIN: SIGNIFICANT CHANGE UP
-  TETRACYCLINE: SIGNIFICANT CHANGE UP
-  TRIMETHOPRIM/SULFAMETHOXAZOLE: SIGNIFICANT CHANGE UP
-  VANCOMYCIN: SIGNIFICANT CHANGE UP
CULTURE RESULTS: SIGNIFICANT CHANGE UP
METHOD TYPE: SIGNIFICANT CHANGE UP
ORGANISM # SPEC MICROSCOPIC CNT: SIGNIFICANT CHANGE UP
ORGANISM # SPEC MICROSCOPIC CNT: SIGNIFICANT CHANGE UP
SPECIMEN SOURCE: SIGNIFICANT CHANGE UP

## 2022-02-06 LAB
CULTURE RESULTS: SIGNIFICANT CHANGE UP
SPECIMEN SOURCE: SIGNIFICANT CHANGE UP

## 2022-04-20 ENCOUNTER — APPOINTMENT (OUTPATIENT)
Dept: ORTHOPEDIC SURGERY | Facility: CLINIC | Age: 32
End: 2022-04-20

## 2022-05-12 ENCOUNTER — APPOINTMENT (OUTPATIENT)
Dept: ORTHOPEDIC SURGERY | Facility: CLINIC | Age: 32
End: 2022-05-12

## 2022-11-07 ENCOUNTER — EMERGENCY (EMERGENCY)
Facility: HOSPITAL | Age: 32
LOS: 1 days | Discharge: LEFT BEFORE DISCHARGE | End: 2022-11-07
Attending: EMERGENCY MEDICINE
Payer: MEDICAID

## 2022-11-07 VITALS
DIASTOLIC BLOOD PRESSURE: 67 MMHG | SYSTOLIC BLOOD PRESSURE: 135 MMHG | WEIGHT: 229.94 LBS | HEART RATE: 82 BPM | RESPIRATION RATE: 20 BRPM | OXYGEN SATURATION: 99 % | TEMPERATURE: 98 F

## 2022-11-07 PROCEDURE — 99282 EMERGENCY DEPT VISIT SF MDM: CPT

## 2022-11-07 PROCEDURE — 99284 EMERGENCY DEPT VISIT MOD MDM: CPT

## 2022-11-07 RX ORDER — LIDOCAINE 4 G/100G
1 CREAM TOPICAL ONCE
Refills: 0 | Status: DISCONTINUED | OUTPATIENT
Start: 2022-11-07 | End: 2022-11-07

## 2022-11-07 RX ORDER — ACETAMINOPHEN 500 MG
650 TABLET ORAL ONCE
Refills: 0 | Status: DISCONTINUED | OUTPATIENT
Start: 2022-11-07 | End: 2022-11-07

## 2022-11-07 RX ORDER — METHOCARBAMOL 500 MG/1
1500 TABLET, FILM COATED ORAL ONCE
Refills: 0 | Status: DISCONTINUED | OUTPATIENT
Start: 2022-11-07 | End: 2022-11-07
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
## 2022-11-07 NOTE — ED PROVIDER NOTE - NS ED ATTENDING STATEMENT MOD
This was a shared visit with the JULES. I reviewed and verified the documentation and independently performed the documented:
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
## 2023-02-07 NOTE — ED ADULT TRIAGE NOTE - SOURCE OF INFORMATION
Patient Calcipotriene Counseling:  I discussed with the patient the risks of calcipotriene including but not limited to erythema, scaling, itching, and irritation.

## 2023-02-09 NOTE — ED ADULT TRIAGE NOTE - CHIEF COMPLAINT QUOTE
Brief Postoperative Note      Patient: Richard Brandt  YOB: 1964  MRN: [id number]    Date of Procedure: 2/9/2023    Pre-Op Diagnosis: Alternating constipation and diarrhea [R19.8]    Post-Op Diagnosis: Same       Procedure(s):  COLONOSCOPY POLYPECTOMY HOT BIOPSY    Surgeon(s):  Jaret Rush DO    Assistant:  * No surgical staff found *    Anesthesia: General    Estimated Blood Loss (mL): Minimal    Complications: None    Specimens:   ID Type Source Tests Collected by Time Destination   A : POLYP AT 40CM Tissue Colon SURGICAL PATHOLOGY Jaret Rush DO 2/9/2023 1318    B : POLYP AT 30CM Tissue Colon SURGICAL PATHOLOGY Jaret Rush DO 2/9/2023 1325    C : POLYP AT 15CM  Tissue Colon [address], DO 2/9/2023 1327        Implants:  * No implants in log *      Drains: * No LDAs found *    Findings: see dictation    Electronically signed by Jaret Rush DO on 2/9/2023 at 1:36 PM Pt states left AMA and was receiving IV antibiotics for unknown reason (hand is swollen), states he is back now after taking care of what he had to do. Pt states there is an improvement to his hand pain/swelling/redness.

## 2023-02-22 ENCOUNTER — EMERGENCY (EMERGENCY)
Facility: HOSPITAL | Age: 33
LOS: 1 days | Discharge: HOME | End: 2023-02-22
Attending: STUDENT IN AN ORGANIZED HEALTH CARE EDUCATION/TRAINING PROGRAM
Payer: MEDICAID

## 2023-02-22 VITALS
DIASTOLIC BLOOD PRESSURE: 87 MMHG | RESPIRATION RATE: 18 BRPM | SYSTOLIC BLOOD PRESSURE: 138 MMHG | TEMPERATURE: 98 F | WEIGHT: 200.4 LBS | OXYGEN SATURATION: 99 % | HEIGHT: 70 IN | HEART RATE: 79 BPM

## 2023-02-22 PROCEDURE — 99284 EMERGENCY DEPT VISIT MOD MDM: CPT

## 2023-02-22 PROCEDURE — 99285 EMERGENCY DEPT VISIT HI MDM: CPT
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
## 2023-02-22 NOTE — ED PROVIDER NOTE - CLINICAL SUMMARY MEDICAL DECISION MAKING FREE TEXT BOX
Pt now fully awake.  Pt sober and ambulating with steady gait.  Pt is now under arrest.  Pt has no conditions that preclude incarceration. will d/c into police custody.

## 2023-02-22 NOTE — ED ADULT NURSE REASSESSMENT NOTE - NS ED NURSE REASSESS COMMENT FT1
Assumed care of patient at approx 20:30. Patient received in yellow gown. Patient sleepy, lethargic but easy to arouse. Placed on cardiac monitor, NSR noted with SpO2 of 99% on room air. [employer] at bedside and patient placed in handcuffs and patient able to move. No visible signs of acute distress noted, safety maintained.
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
## 2023-02-22 NOTE — ED ADULT NURSE NOTE - CAS ELECT INFOMATION PROVIDED
Patient discharged and belongings given to [employer]. Patient ambulated off unit in no acute distress at time of discharge./DC instructions

## 2023-02-22 NOTE — ED PROVIDER NOTE - PHYSICAL EXAMINATION
Constitutional - well-developed.   Head - NCAT. Airway patent.   Eyes - PERRL.   CV - RRR. no murmur. no edema.   Pulm - CTAB.   Abd - soft, nt. no rebound. no guarding.   Neuro - A&Ox3. drowsy but arousable to verbal stimuli strength 5/5 x4. sensation intact x4. normal gait.   Skin - No rash. .  MSK - normal ROM.

## 2023-02-22 NOTE — ED PROVIDER NOTE - PATIENT PORTAL LINK FT
You can access the FollowMyHealth Patient Portal offered by [facility] by registering at the following website: [email or web address]. By joining [facility]’s FollowMyHealth portal, you will also be able to view your health information using other applications (apps) compatible with our system.
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
## 2023-06-06 NOTE — ED ADULT TRIAGE NOTE - HEART RATE (BEATS/MIN)
Occupational Therapy    Patient not seen in therapy.     Unavailable due to medical tests/procedures.      Patient currently unavailable off unit at dialysis. OT will re-attempt as time allows.     1150- Second attempt: Patient returned from dialysis. Patient currently with alternative healthcare provider at this time. OT will re-attempt per established plan of care.                              Therapy procedure time and total treatment time can be found documented on the Time Entry flowsheet   82

## 2023-08-30 ENCOUNTER — EMERGENCY (EMERGENCY)
Facility: HOSPITAL | Age: 33
LOS: 1 days | End: 2023-08-30
Attending: EMERGENCY MEDICINE
Payer: MEDICAID

## 2023-08-30 VITALS
DIASTOLIC BLOOD PRESSURE: 73 MMHG | HEART RATE: 95 BPM | TEMPERATURE: 99 F | SYSTOLIC BLOOD PRESSURE: 129 MMHG | RESPIRATION RATE: 17 BRPM | WEIGHT: 210.1 LBS | OXYGEN SATURATION: 96 %

## 2023-08-30 LAB
ALBUMIN SERPL ELPH-MCNC: 4.1 G/DL — SIGNIFICANT CHANGE UP (ref 3.3–5.2)
ALP SERPL-CCNC: 94 U/L — SIGNIFICANT CHANGE UP (ref 40–120)
ALT FLD-CCNC: 20 U/L — SIGNIFICANT CHANGE UP
ANION GAP SERPL CALC-SCNC: 13 MMOL/L — SIGNIFICANT CHANGE UP (ref 5–17)
AST SERPL-CCNC: 25 U/L — SIGNIFICANT CHANGE UP
BASOPHILS # BLD AUTO: 0.03 K/UL — SIGNIFICANT CHANGE UP (ref 0–0.2)
BASOPHILS NFR BLD AUTO: 0.3 % — SIGNIFICANT CHANGE UP (ref 0–2)
BILIRUB SERPL-MCNC: 0.3 MG/DL — LOW (ref 0.4–2)
BUN SERPL-MCNC: 7.4 MG/DL — LOW (ref 8–20)
CALCIUM SERPL-MCNC: 9.1 MG/DL — SIGNIFICANT CHANGE UP (ref 8.4–10.5)
CHLORIDE SERPL-SCNC: 99 MMOL/L — SIGNIFICANT CHANGE UP (ref 96–108)
CK SERPL-CCNC: 133 U/L — SIGNIFICANT CHANGE UP (ref 30–200)
CO2 SERPL-SCNC: 25 MMOL/L — SIGNIFICANT CHANGE UP (ref 22–29)
CREAT SERPL-MCNC: 0.89 MG/DL — SIGNIFICANT CHANGE UP (ref 0.5–1.3)
EGFR: 118 ML/MIN/1.73M2 — SIGNIFICANT CHANGE UP
EOSINOPHIL # BLD AUTO: 0.06 K/UL — SIGNIFICANT CHANGE UP (ref 0–0.5)
EOSINOPHIL NFR BLD AUTO: 0.7 % — SIGNIFICANT CHANGE UP (ref 0–6)
GLUCOSE SERPL-MCNC: 128 MG/DL — HIGH (ref 70–99)
HCT VFR BLD CALC: 41.7 % — SIGNIFICANT CHANGE UP (ref 39–50)
HGB BLD-MCNC: 14 G/DL — SIGNIFICANT CHANGE UP (ref 13–17)
IMM GRANULOCYTES NFR BLD AUTO: 0.2 % — SIGNIFICANT CHANGE UP (ref 0–0.9)
LYMPHOCYTES # BLD AUTO: 1.68 K/UL — SIGNIFICANT CHANGE UP (ref 1–3.3)
LYMPHOCYTES # BLD AUTO: 19.5 % — SIGNIFICANT CHANGE UP (ref 13–44)
MCHC RBC-ENTMCNC: 30.5 PG — SIGNIFICANT CHANGE UP (ref 27–34)
MCHC RBC-ENTMCNC: 33.6 GM/DL — SIGNIFICANT CHANGE UP (ref 32–36)
MCV RBC AUTO: 90.8 FL — SIGNIFICANT CHANGE UP (ref 80–100)
MONOCYTES # BLD AUTO: 0.56 K/UL — SIGNIFICANT CHANGE UP (ref 0–0.9)
MONOCYTES NFR BLD AUTO: 6.5 % — SIGNIFICANT CHANGE UP (ref 2–14)
NEUTROPHILS # BLD AUTO: 6.27 K/UL — SIGNIFICANT CHANGE UP (ref 1.8–7.4)
NEUTROPHILS NFR BLD AUTO: 72.8 % — SIGNIFICANT CHANGE UP (ref 43–77)
PLATELET # BLD AUTO: 169 K/UL — SIGNIFICANT CHANGE UP (ref 150–400)
POTASSIUM SERPL-MCNC: 4.6 MMOL/L — SIGNIFICANT CHANGE UP (ref 3.5–5.3)
POTASSIUM SERPL-SCNC: 4.6 MMOL/L — SIGNIFICANT CHANGE UP (ref 3.5–5.3)
PROT SERPL-MCNC: 7.3 G/DL — SIGNIFICANT CHANGE UP (ref 6.6–8.7)
RBC # BLD: 4.59 M/UL — SIGNIFICANT CHANGE UP (ref 4.2–5.8)
RBC # FLD: 11.9 % — SIGNIFICANT CHANGE UP (ref 10.3–14.5)
SODIUM SERPL-SCNC: 137 MMOL/L — SIGNIFICANT CHANGE UP (ref 135–145)
WBC # BLD: 8.62 K/UL — SIGNIFICANT CHANGE UP (ref 3.8–10.5)
WBC # FLD AUTO: 8.62 K/UL — SIGNIFICANT CHANGE UP (ref 3.8–10.5)

## 2023-08-30 PROCEDURE — 85025 COMPLETE CBC W/AUTO DIFF WBC: CPT

## 2023-08-30 PROCEDURE — 99284 EMERGENCY DEPT VISIT MOD MDM: CPT

## 2023-08-30 PROCEDURE — 96374 THER/PROPH/DIAG INJ IV PUSH: CPT

## 2023-08-30 PROCEDURE — 99284 EMERGENCY DEPT VISIT MOD MDM: CPT | Mod: 25

## 2023-08-30 PROCEDURE — 36415 COLL VENOUS BLD VENIPUNCTURE: CPT

## 2023-08-30 PROCEDURE — 80053 COMPREHEN METABOLIC PANEL: CPT

## 2023-08-30 PROCEDURE — 82550 ASSAY OF CK (CPK): CPT

## 2023-08-30 RX ORDER — SODIUM CHLORIDE 9 MG/ML
1000 INJECTION INTRAMUSCULAR; INTRAVENOUS; SUBCUTANEOUS ONCE
Refills: 0 | Status: COMPLETED | OUTPATIENT
Start: 2023-08-30 | End: 2023-08-30

## 2023-08-30 RX ADMIN — SODIUM CHLORIDE 1000 MILLILITER(S): 9 INJECTION INTRAMUSCULAR; INTRAVENOUS; SUBCUTANEOUS at 13:46

## 2023-08-30 RX ADMIN — Medication 100 MILLIGRAM(S): at 13:45

## 2023-08-30 NOTE — ED STATDOCS - CLINICAL SUMMARY MEDICAL DECISION MAKING FREE TEXT BOX
pt with cellulitis to right foot, right ankle, ascending aminating from IV placed in dorsum of foot, no other systemic symptoms, pt has been on outpatient antibiotics for several day without response, will treat with IV antibiotics. pt with cellulitis to right foot, right ankle, ascending aminating from IV placed in dorsum of foot, no other systemic symptoms, pt has been on outpatient antibiotics for several day without response, will treat with IV antibiotics.    Pts labs reviewed. Pt stable for d/c on clinda with podiatry f/u as needed.
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
## 2023-08-30 NOTE — ED STATDOCS - OBJECTIVE STATEMENT
32 y/o male with PMHx of opioid abuse, PTSD, anxiety presents to the ED c/o right foot redness and swelling, states has been on PO antibiotics QID for 3 days as it has been worsening. Pt states he was at [facility] and had IV placed in foot due to difficult IV access.

## 2023-08-30 NOTE — ED STATDOCS - CARE PROVIDER_API CALL
Vince Meyers  Podiatric Medicine and Surgery  [address] [place] [address]  Phone: [phone]  Fax: [phone]  Follow Up Time:

## 2023-08-30 NOTE — ED STATDOCS - PATIENT PORTAL LINK FT
You can access the FollowMyHealth Patient Portal offered by [facility] by registering at the following website: [email or web address]. By joining [facility]’s FollowMyHealth portal, you will also be able to view your health information using other applications (apps) compatible with our system.
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
## 2023-08-30 NOTE — ED STATDOCS - NS_ ATTENDINGSCRIBEDETAILS _ED_A_ED_FT
8 steps
I performed the initial face to face bedside interview with this patient regarding history of present illness, review of symptoms and relevant past medical, social and family history.  I completed an independent physical examination.  I was the initial provider who evaluated this patient. I have signed out the follow up of any pending tests (i.e. labs, radiological studies) to the ACP.  I have communicated the patient’s plan of care and disposition with the ACP.

## 2023-09-21 NOTE — ED PROVIDER NOTE - PHYSICAL EXAMINATION
You physician has noted that your blood work is normal and has ruled out celiac disease and inflammatory bowel disease as a cause of your symptoms. Which is good news, but can also be frustrating. This means that you have \"irritable bowel- constipated\" type. IBD-c can be frustrating, but there are ways to prevent the cycle from flaring up. Stress management, medications for anxiety which can be used for short term, daily meaningful relaxing exercises like yoga, and Advise: reduce intake of fried fatty, start a fiber supplement, increase water intake to 64 ounces. 30-60 minutes of aerobic exercise. Look up the following diets for examples of healthy eating habits, the mediterranean diet, and the whole 30.
General: well appearing, interactive, well nourished, NAD  HEENT: pupils equal and reactive, normal external ears bilaterally, neck supple  Cardiac: RRR, no MRG appreciated  Resp: lungs clear to auscultation bilaterally, symmetric chest wall rise  Abd: soft, nontender, nondistended,   : no CVA tenderness  Neuro: Moving all extremities, no focal deficits  Skin:  no rashes, warm, dry

## 2023-11-07 ENCOUNTER — NON-APPOINTMENT (OUTPATIENT)
Age: 33
End: 2023-11-07

## 2023-12-10 ENCOUNTER — NON-APPOINTMENT (OUTPATIENT)
Age: 33
End: 2023-12-10

## 2024-04-16 NOTE — PROGRESS NOTE ADULT - REASON FOR ADMISSION
hand cellulitis 04-16    142  |  110<H>  |  19<H>  ----------------------------<  125<H>  4.3   |  23  |  0.77    Ca    9.3      16 Apr 2024 06:38  Phos  3.8     04-16  Mg     2.5     04-16    TPro  7.4  /  Alb  3.0<L>  /  TBili  0.5  /  DBili  x   /  AST  144<H>  /  ALT  249<H>  /  AlkPhos  249<H>  04-16  A1C with Estimated Average Glucose Result: 6.4 % (04-13-24 @ 04:35)

## 2024-05-01 NOTE — ED ADULT NURSE NOTE - IS THE PATIENT ABLE TO BE SCREENED?
HEART CARE ENCOUNTER CONSULTATON NOTE      [facility]  [phone]      Assessment/Recommendations   Assessment:   Chronic congestive heart failure secondary to heart failure with preserved ejection fraction, moderate left ventricular hypertrophy  Hypertension  Left ventricle hypertrophy secondary to hypertension  Mild aortic stenosis  Diabetes mellitus type 2    Plan:  Continue Lasix at 40 mg daily  Increase amlodipine to 10 mg daily  Continue losartan at 100 mg daily  Continue atorvastatin 20 mg daily  Discussed Jardiance 10 mg daily for heart failure preserved ejection fraction.         History of Present Illness/Subjective    HPI: Katt Dsouza is a 83 year old female congestive heart failure, hypertension, left hypertrophy, aortic stenosis, diabetes mellitus type 2 presents to cardiology clinic to establish care with myself.    Previously followed by Dr. Francisco    Patient was hospitalized in January 2024 for congestive heart failure.  There she was diuresed effectively with Lasix therapy.  She underwent echocardiogram which demonstrated preserved left ventricular ejection fraction with moderate left-ventricular appear to be in diastolic dysfunction of the left ventricle.  Aortic stenosis quantified as mild.  Patient also underwent stress testing with demonstrated preserved left ventricular ejection fraction with fixed perfusion defect in the inferior and apical segments no clear evidence of ischemia.    Currently she is doing well.  She notes improvement in her breathing.  She denies any significant pitting edema.  Energy levels improved with changing from hydrochlorothiazide to Lasix.  Blood pressure is elevated today.  Will titrate up amlodipine.    We discussed SGLT 2 inhibitors.    Echocardiogram Results:  Outside records from [place] reviewed.  Under media tab     Physical Examination  Review of Systems   Vitals: BP (!) 168/62 (BP Location: Left arm, Patient Position: Sitting, Cuff  Size: Adult Regular)   Pulse 57   Resp 14   Wt 81.6 kg (180 lb)   SpO2 96%   BMI 30.90 kg/m    BMI= Body mass index is 30.9 kg/m .  Wt Readings from Last 3 Encounters:   24 81.6 kg (180 lb)   23 86.2 kg (190 lb)   23 91.6 kg (202 lb)        Pleasant   ENT/Mouth: membranes moist, no oral lesions or bleeding gums.      EYES:  no scleral icterus, normal conjunctivae       Chest/Lungs:   lungs are clear to auscultation, no rales or wheezing, no sternal scar, equal chest wall expansion    Cardiovascular:   Regular. Normal first and second heart sounds with mid peaking systolic murmurs, rubs, or gallops; the carotid, radial and posterior tibial pulses are intact, Jugular venous pressure , trace edema bilaterally    Abdomen:  no tenderness; bowel sounds are present   Extremities: no cyanosis or clubbing   Skin: no xanthelasma, warm.    Neurologic: normal  bilateral, no tremors     Psychiatric: alert and oriented x3, calm        Please refer above for cardiac ROS details.        Medical History  Surgical History Family History Social History   Past Medical History:   Diagnosis Date    Diabetes (H)     Hypertension     Uncomplicated asthma      Past Surgical History:   Procedure Laterality Date    BACK SURGERY      LAMINECTOMY LUMBAR ONE LEVEL Bilateral 2022    Procedure: LUMBAR 3 - LUMBAR 4 BILATERAL MEDIAL FACETECTOMY, DECOMPRESSION PARTIAL DISCECTOMY;  Surgeon: Leno Recinos MD;  Location: [facility] Main OR     No family history on file.     Social History     Socioeconomic History    Marital status:      Spouse name: Not on file    Number of children: Not on file    Years of education: Not on file    Highest education level: Not on file   Occupational History    Not on file   Tobacco Use    Smoking status: Former     Current packs/day: 0.00     Types: Cigarettes     Quit date: 1995     Years since quittin.3    Smokeless tobacco: Never   Vaping Use    Vaping  status: Never Used   Substance and Sexual Activity    Alcohol use: Yes     Comment: rare    Drug use: Never    Sexual activity: Not on file   Other Topics Concern    Not on file   Social History Narrative    Not on file     Social Determinants of Health     Financial Resource Strain: Not on file   Food Insecurity: Not on file   Transportation Needs: Not on file   Physical Activity: Not on file   Stress: Not on file   Social Connections: Not on file   Interpersonal Safety: Not on file   Housing Stability: Not on file           Medications  Allergies   Current Outpatient Medications   Medication Sig Dispense Refill    acetaminophen (TYLENOL) 325 MG tablet Take 1-2 tablets (325-650 mg) by mouth every 4 hours as needed for mild pain 50 tablet 0    albuterol (PROAIR HFA/PROVENTIL HFA/VENTOLIN HFA) 108 (90 Base) MCG/ACT inhaler Inhale 2 puffs into the lungs every 4 hours as needed for shortness of breath or wheezing 8.5 g 11    allopurinoL (ZYLOPRIM) 100 MG tablet [ALLOPURINOL (ZYLOPRIM) 100 MG TABLET] Take 100 mg by mouth daily.      amLODIPine (NORVASC) 10 MG tablet Take 5 mg by mouth daily      atorvastatin (LIPITOR) 20 MG tablet [ATORVASTATIN (LIPITOR) 20 MG TABLET] Take 20 mg by mouth at bedtime.      fluticasone (FLONASE) 50 MCG/ACT nasal spray spray 2 sprays into each nostril 2 (two) times a day. 16 g 11    Fluticasone-Umeclidin-Vilant (TRELEGY ELLIPTA) 100-62.5-25 MCG/ACT oral inhaler Inhale 1 puff into the lungs daily 60 each 11    gabapentin (NEURONTIN) 300 MG capsule Take 600 mg by mouth At Bedtime       LASIX 40 MG tablet Take 1 tablet by mouth daily      losartan (COZAAR) 100 MG tablet Take 100 mg by mouth daily      metFORMIN (GLUCOPHAGE XR) 500 MG 24 hr tablet Take 500 mg by mouth 2 times daily (with meals)      methocarbamol (ROBAXIN) 500 MG tablet Take 1 tablet (500 mg) by mouth every 6 hours as needed for muscle spasms (muscle spasm) 40 tablet 0    SYNTHROID 50 MCG tablet Take 1 tablet by mouth daily       zolpidem (AMBIEN) 10 MG tablet Take 1 tablet (10 mg) by mouth nightly as needed for sleep 30 tablet 0       Allergies   Allergen Reactions    Animal Dander Unknown    Penicillins Hives    Sulfa Antibiotics Hives          Lab Results    Chemistry/lipid CBC Cardiac Enzymes/BNP/TSH/INR   Recent Labs   Lab Test 11/08/23  1215   CHOL 158   HDL 53   LDL 85   TRIG 101     Recent Labs   Lab Test 11/08/23  1215 10/03/22  1127 03/03/21  1335   LDL 85 80 51     Recent Labs   Lab Test 11/08/23  1215      POTASSIUM 4.3   CHLORIDE 109*   CO2 23   *   BUN 32.8*   CR 1.22*   GFRESTIMATED 44*   MARIO 9.2     Recent Labs   Lab Test 11/08/23  1215 09/11/23  1113 06/29/23  1336   CR 1.22* 1.07* 1.19*     Recent Labs   Lab Test 11/08/23  1215   A1C 6.5*          Recent Labs   Lab Test 09/11/23  1142   WBC 7.8   HGB 13.3   HCT 40.1   MCV 92        Recent Labs   Lab Test 09/11/23  1142 11/09/22  1038 05/19/22  1428   HGB 13.3 12.1 13.7    Recent Labs   Lab Test 01/28/22  2151 01/28/22  1831   TROPONINI 0.03 0.01     Recent Labs   Lab Test 09/11/23  1113 05/19/22  1428 04/26/21  1455 03/24/21  1219 07/05/19  1037   BNP  --  78 48  --  40   NTBNP 302  --   --  113  --      Recent Labs   Lab Test 05/19/22  1428   TSH 2.55     Recent Labs   Lab Test 01/28/22  1831   INR 1.01        David Holman DO  Cardiologist     33 minutes spent by me on the date of the encounter doing chart review, history and exam, documentation and further activities per the note                             No

## 2024-06-12 ENCOUNTER — OFFICE (OUTPATIENT)
Dept: URBAN - METROPOLITAN AREA CLINIC 116 | Facility: CLINIC | Age: 34
Setting detail: OPHTHALMOLOGY
End: 2024-06-12
Payer: COMMERCIAL

## 2024-06-12 DIAGNOSIS — H52.213: ICD-10-CM

## 2024-06-12 DIAGNOSIS — H17.9: ICD-10-CM

## 2024-06-12 DIAGNOSIS — H52.13: ICD-10-CM

## 2024-06-12 PROCEDURE — 99213 OFFICE O/P EST LOW 20 MIN: CPT | Performed by: OPTOMETRIST

## 2024-06-12 PROCEDURE — V2531 CONTACT LENS GAS PERMEABLE: HCPCS | Performed by: OPTOMETRIST

## 2024-06-12 PROCEDURE — 92313 C-LENS FITG CORNEOSCLRL LENS: CPT | Performed by: OPTOMETRIST

## 2024-06-12 ASSESSMENT — CONFRONTATIONAL VISUAL FIELD TEST (CVF)
OD_FINDINGS: FULL
OS_FINDINGS: FULL

## 2024-06-26 ENCOUNTER — OFFICE (OUTPATIENT)
Dept: URBAN - METROPOLITAN AREA CLINIC 116 | Facility: CLINIC | Age: 34
Setting detail: OPHTHALMOLOGY
End: 2024-06-26
Payer: MEDICAID

## 2024-06-26 DIAGNOSIS — H52.213: ICD-10-CM

## 2024-06-26 DIAGNOSIS — H17.9: ICD-10-CM

## 2024-06-26 PROCEDURE — N/C NO CHARGE: Performed by: OPTOMETRIST

## 2024-06-26 ASSESSMENT — CONFRONTATIONAL VISUAL FIELD TEST (CVF)
OD_FINDINGS: FULL
OS_FINDINGS: FULL

## 2024-07-22 NOTE — ED STATDOCS - CROS ED GI ALL NEG
Tried to call pt to let her know to come upstairs to [address] for her apt, lvm letting her know.    negative...

## 2024-07-24 NOTE — ED ADULT TRIAGE NOTE - HEIGHT IN CM
"PSYCHIATRIC INTAKE EVALUATION (new)  Reason for admission: Unspecified drug OD  Reason for consult: Legal hold evaluation, presumed suicidal attempt  Requesting Provider: Cristofer Tejeda DO  Legal Hold Status: ACTIVE, CERTIFIED  Chart reviewed.         *HPI: Patient is a 66 y.o. male with history of prior suicide attempt on 7/18/2018, unspecified mood d/o (?anxiety and ?depression) who was brought to the hospital for drug OD. On chart review, the pt was found by patient's brother yesterday afternoon, difficult to arouse.  Concerns that the patient had overdosed on amlodipine and other medications.  Per documentation, the patient was on:    -Chlorthalidone 25 mg daily  - Clonazepam 1-2 mg nightly  -Sertraline 75 mg daily  -Indomethacin 50 mg daily as needed  -Amlodipine-benazepril 5-10 mg daily  -Oxybutynin ER 15 mg daily  -Quetiapine 300 mg daily    Patient does have a prior suicide attempts on 7/18/2020 to 18 in which he ingested x 15 doxepin 75 mg, at that time patient had  from his wife.  Patient was thereafter treated at [facility].    Today the pt was A&O x 3 to self, situation, and year. Pt's speech was clearly articulated but incoherently organized, easily distracted and difficulty expressing thoughts. He did say he \"OD'd\" prior to coming to the hospital and could not clearly provide a history leading to to the OD. However at this time the pt says mood is \"unhappy,\" unable to clearly communicate whether he is still suicidal at this time. He did say he \"left my family behind.\" Was frequently pointing/gesturing to the whiteboard in the corner of the room, denied overt auditory hallucinations.     Brother, Jean Paul, was called later. States he and other brother (Cortes) had been supporting the pt for the last couple of months. The pt had a workplace GLF and hit head, was admitted to [facility] in 3/2024 and medically cleared. He returned home and remained on leave from work. The pt has also recently been facing " "pressure from ex-wife to remodel/fix-up house they lived in prior to divorce. The pt was finally able to return to work last Friday, and both brothers believed he seemed to be doing well without any unusual behaviors or concerns for suicidal gestures/comments. His mood appeared unremarkable. On Monday, the pt was not picking up phone calls later at night, but the brothers assumed this was likely because the pt had a long day's work. The brothers visited the pt yesterday and noticed he was \"not acting right.\" He was speaking incoherently, which prompted them to contact emergency services. Jean Paul did speculate that the pt is \"borderline autistic,\" saying the pt does get easily overwhelmed when routines are changed. He believes the pt worsened when he couldn't resume his routine of work.     Psychiatric ROS:  Depression: Unable to accurately assess, \"unhappy,\" per HPI. Per collateral, pt did not appear having problematic symptoms/behaviors suggestive of depressed mood, disengagement/isolation.  Minerva: Unable to assess given condition.  Anxiety: Unable to assess given condition.  PTSD: Unable to assess given condition.  Psychosis: Frequently pointing to whiteboard in room during interview, otherwise denied AH.      Medical ROS (as pertinent):     Review of Systems   Neurological:  Negative for dizziness, sensory change, focal weakness and headaches.       *Psychiatric Examination:  Vitals:   Vitals:    07/24/24 0700   BP: 113/59   Pulse: 85   Resp: 14   Temp:    SpO2: 97%        General Appearance: Appears state age, appropriate grooming & hygiene, no acute distress, lying reclined in bed.  Behavior:    -Frequently pointing to whiteboard in room, looking up at ceiling with no clear purpose, dazed eye contact, otherwise pleasant & somewhat cooperative but distractible.    -No tremors noted   -General psychomotor slowing  Speech: Plethora, spontaneous. Regular rate and rhythm. Appropriate volume and intonation. Articulation " "is clear.  Language: English  Thought processes: Incoherent, nonlinear and tangential, not logical and goal-directed. No evidence of loose associations.  Thought content: No evidence of SI/HI/AVH, though accuracy of assessment questionable given delirium. Not observed responding to internal stimuli. No evidence of delusions or paranoia.  Mood: \"Unhappy.\"  Affect: Congruent with stated mood. Restricted range, neutral with some dysthymia, often appearing distracted and not always reactive to conversation. No evidence of lability, ernie, or agitation.  Judgement and Insight: Impaired/impaired  Cognition:    -Alert & oriented x 3 (Person, situation, and year)   -Attention & concentration impaired, frequent redirecting    -Immediate/delayed memory not formally tested, grossly appears impaired as the pt is unable to provide clear narrative leading to hospitalization   -Age-appropriate fund of knowledge    Past Medical History:   Past Medical History:   Diagnosis Date    HTN (hypertension)     Suicide attempt (HCC)         Past Psychiatric History:  Unable to obtain from pt, pending collateral from outpatient psychiatry provider per brother (Jean Paul), Dr. Dubose.    Family Hx:   Unable to obtain from pt.    Social Hx:   Two brothers, Cortes and Jean Paul; two children, YOSELIN and Tressa.    Substances:  Unable to obtain from pt.    Current Medications:  Current Facility-Administered Medications   Medication Dose Route Frequency Provider Last Rate Last Admin    potassium chloride (KCL) ivpb 10 mEq  10 mEq Intravenous Q HOUR Earl Lechuga M.D. 100 mL/hr at 07/24/24 0815 10 mEq at 07/24/24 0815    lactated ringers infusion   Intravenous Continuous Cristofer Tejeda D.O.            Allergies:  Sulfa drugs     Labs personally reviewed:   Recent Results (from the past 72 hour(s))   EKG    Collection Time: 07/23/24  7:51 PM   Result Value Ref Range    Report       [facility] Emergency Dept.    Test Date:  " 2024  Pt Name:    FAIZAN GARDNER             Department: ER  MRN:        [id number]                      Room:        12  Gender:     Male                         Technician: [id number]  :        1957                   Requested By:ER TRIAGE PROTOCOL  Order #:    [id number]                    Reading MD: ILDEFONSO ARNOLD DO    Measurements  Intervals                                Axis  Rate:       85                           P:          61  LA:         153                          QRS:        63  QRSD:       106                          T:          25  QT:         413  QTc:        492    Interpretive Statements  Sinus rhythm  Probable left atrial enlargement  Borderline prolonged QT interval  Compared to ECG 2024 17:09:50  No significant changes  Electronically Signed On 2024 00:56:04 PDT by ILDEFONSO ARNOLD DO     DIAGNOSTIC ALCOHOL (BA)    Collection Time: 24  8:00 PM   Result Value Ref Range    Diagnostic Alcohol <10.1 <10.1 mg/dL   CBC WITH DIFFERENTIAL    Collection Time: 24  8:00 PM   Result Value Ref Range    WBC 18.6 (H) 4.8 - 10.8 K/uL    RBC 4.12 (L) 4.70 - 6.10 M/uL    Hemoglobin 12.8 (L) 14.0 - 18.0 g/dL    Hematocrit 35.3 (L) 42.0 - 52.0 %    MCV 85.7 81.4 - 97.8 fL    MCH 31.1 27.0 - 33.0 pg    MCHC 36.3 32.3 - 36.5 g/dL    RDW 41.1 35.9 - 50.0 fL    Platelet Count 243 164 - 446 K/uL    MPV 9.4 9.0 - 12.9 fL    Neutrophils-Polys 90.00 (H) 44.00 - 72.00 %    Lymphocytes 3.70 (L) 22.00 - 41.00 %    Monocytes 5.80 0.00 - 13.40 %    Eosinophils 0.10 0.00 - 6.90 %    Basophils 0.10 0.00 - 1.80 %    Immature Granulocytes 0.30 0.00 - 0.90 %    Nucleated RBC 0.00 0.00 - 0.20 /100 WBC    Neutrophils (Absolute) 16.69 (H) 1.82 - 7.42 K/uL    Lymphs (Absolute) 0.69 (L) 1.00 - 4.80 K/uL    Monos (Absolute) 1.07 (H) 0.00 - 0.85 K/uL    Eos (Absolute) 0.02 0.00 - 0.51 K/uL    Baso (Absolute) 0.02 0.00 - 0.12 K/uL    Immature Granulocytes (abs) 0.06 0.00 - 0.11 K/uL    NRBC  (Absolute) 0.00 K/uL   Acetaminophen Level    Collection Time: 07/23/24  8:00 PM   Result Value Ref Range    Acetaminophen -Tylenol <5.0 (L) 10.0 - 30.0 ug/mL   SALICYLATE LEVEL    Collection Time: 07/23/24  8:00 PM   Result Value Ref Range    Salicylates, Quant. <1.0 (L) 15.0 - 25.0 mg/dL   Comp Metabolic Panel    Collection Time: 07/23/24  8:00 PM   Result Value Ref Range    Sodium 129 (L) 135 - 145 mmol/L    Potassium 3.4 (L) 3.6 - 5.5 mmol/L    Chloride 91 (L) 96 - 112 mmol/L    Co2 19 (L) 20 - 33 mmol/L    Anion Gap 19.0 (H) 7.0 - 16.0    Glucose 142 (H) 65 - 99 mg/dL    Bun 52 (H) 8 - 22 mg/dL    Creatinine 3.77 (H) 0.50 - 1.40 mg/dL    Calcium 8.8 8.5 - 10.5 mg/dL    Correct Calcium 9.2 8.5 - 10.5 mg/dL    AST(SGOT) 194 (H) 12 - 45 U/L    ALT(SGPT) 88 (H) 2 - 50 U/L    Alkaline Phosphatase 75 30 - 99 U/L    Total Bilirubin 0.5 0.1 - 1.5 mg/dL    Albumin 3.5 3.2 - 4.9 g/dL    Total Protein 5.7 (L) 6.0 - 8.2 g/dL    Globulin 2.2 1.9 - 3.5 g/dL    A-G Ratio 1.6 g/dL   MAGNESIUM    Collection Time: 07/23/24  8:00 PM   Result Value Ref Range    Magnesium 2.2 1.5 - 2.5 mg/dL   ESTIMATED GFR    Collection Time: 07/23/24  8:00 PM   Result Value Ref Range    GFR (CKD-EPI) 17 (A) >60 mL/min/1.73 m 2   CREATINE KINASE    Collection Time: 07/23/24  8:00 PM   Result Value Ref Range    CPK Total 43511 (HH) 0 - 154 U/L   AMMONIA    Collection Time: 07/23/24  8:25 PM   Result Value Ref Range    Ammonia 11 11 - 45 umol/L   Urine Drug Screen    Collection Time: 07/23/24 10:56 PM   Result Value Ref Range    Amphetamines Urine Negative Negative    Barbiturates Negative Negative    Benzodiazepines Positive (A) Negative    Cocaine Metabolite Negative Negative    Fentanyl, Urine Negative Negative    Methadone Negative Negative    Opiates Negative Negative    Oxycodone Negative Negative    Phencyclidine -Pcp Negative Negative    Propoxyphene Negative Negative    Cannabinoid Metab Negative Negative   LACTIC ACID    Collection Time:  07/24/24  3:50 AM   Result Value Ref Range    Lactic Acid 1.0 0.5 - 2.0 mmol/L   CBC WITH DIFFERENTIAL    Collection Time: 07/24/24  3:50 AM   Result Value Ref Range    WBC 14.7 (H) 4.8 - 10.8 K/uL    RBC 3.96 (L) 4.70 - 6.10 M/uL    Hemoglobin 12.0 (L) 14.0 - 18.0 g/dL    Hematocrit 34.7 (L) 42.0 - 52.0 %    MCV 87.6 81.4 - 97.8 fL    MCH 30.3 27.0 - 33.0 pg    MCHC 34.6 32.3 - 36.5 g/dL    RDW 42.7 35.9 - 50.0 fL    Platelet Count 209 164 - 446 K/uL    MPV 9.3 9.0 - 12.9 fL    Neutrophils-Polys 86.50 (H) 44.00 - 72.00 %    Lymphocytes 7.30 (L) 22.00 - 41.00 %    Monocytes 5.40 0.00 - 13.40 %    Eosinophils 0.40 0.00 - 6.90 %    Basophils 0.10 0.00 - 1.80 %    Immature Granulocytes 0.30 0.00 - 0.90 %    Nucleated RBC 0.00 0.00 - 0.20 /100 WBC    Neutrophils (Absolute) 12.71 (H) 1.82 - 7.42 K/uL    Lymphs (Absolute) 1.08 1.00 - 4.80 K/uL    Monos (Absolute) 0.80 0.00 - 0.85 K/uL    Eos (Absolute) 0.06 0.00 - 0.51 K/uL    Baso (Absolute) 0.02 0.00 - 0.12 K/uL    Immature Granulocytes (abs) 0.04 0.00 - 0.11 K/uL    NRBC (Absolute) 0.00 K/uL   Comp Metabolic Panel    Collection Time: 07/24/24  3:50 AM   Result Value Ref Range    Sodium 137 135 - 145 mmol/L    Potassium 3.2 (L) 3.6 - 5.5 mmol/L    Chloride 103 96 - 112 mmol/L    Co2 19 (L) 20 - 33 mmol/L    Anion Gap 15.0 7.0 - 16.0    Glucose 126 (H) 65 - 99 mg/dL    Bun 46 (H) 8 - 22 mg/dL    Creatinine 2.83 (H) 0.50 - 1.40 mg/dL    Calcium 8.3 (L) 8.5 - 10.5 mg/dL    Correct Calcium 8.9 8.5 - 10.5 mg/dL    AST(SGOT) 226 (H) 12 - 45 U/L    ALT(SGPT) 105 (H) 2 - 50 U/L    Alkaline Phosphatase 72 30 - 99 U/L    Total Bilirubin 0.4 0.1 - 1.5 mg/dL    Albumin 3.2 3.2 - 4.9 g/dL    Total Protein 5.2 (L) 6.0 - 8.2 g/dL    Globulin 2.0 1.9 - 3.5 g/dL    A-G Ratio 1.6 g/dL   CREATINE KINASE    Collection Time: 07/24/24  3:50 AM   Result Value Ref Range    CPK Total 76687 (HH) 0 - 154 U/L   ESTIMATED GFR    Collection Time: 07/24/24  3:50 AM   Result Value Ref Range    GFR  (CKD-EPI) 24 (A) >60 mL/min/1.73 m 2           EKG:   Results for orders placed or performed during the hospital encounter of 24   EKG   Result Value Ref Range    Report       [facility] Emergency Dept.    Test Date:  2024  Pt Name:    FAIZAN GARDNER             Department: ER  MRN:        [id number]                      Room:        12  Gender:     Male                         Technician: [id number]  :        1957                   Requested By:ER TRIAGE PROTOCOL  Order #:    [id number]                    Reading MD: ILDEFONSO ARNOLD DO    Measurements  Intervals                                Axis  Rate:       85                           P:          61  LA:         153                          QRS:        63  QRSD:       106                          T:          25  QT:         413  QTc:        492    Interpretive Statements  Sinus rhythm  Probable left atrial enlargement  Borderline prolonged QT interval  Compared to ECG 2024 17:09:50  No significant changes  Electronically Signed On 2024 00:56:04 PDT by ILDEFONSO ARNOLD DO        Brain Imaging: CT-head  shows atherosclerosis, otherwise no acute processes.  EEG: None available.    Assessment:  Patient is a 66 y.o. male with history of prior suicide attempt on 2018, unspecified mood d/o (?anxiety and ?depression) who was brought to the hospital for drug OD. Pt A&O x3 to person, situation, and year, otherwise incoherent and easily tangential/distractible. Based on collateral from brother, Jean Paul, this is not at the pt's baseline cognition as of yesterday. At this time, pt is delirious likely 2/2 unspecified substance overdose. UDS+ benzodiazepines. BAL, APAP, and salicylates unremarkable. Does have rhabdo with WILMAR.     At this time, recommend continue holding home psychotropic medications. Recommend PRN agitation medications in the event the pt does become dysregulated while delirious.   Recommend daytime  activity and minimizing/eliminating daytime napping when possible to decrease risk of worsening confusion.     Will continue following and assess as mentation improves.    Dx:  #Delirium      Medical:  Per primary team    Plan:  Legal hold: ACTIVE, certified 7/24.  Psychotropic medications: hold home medications. May consider PRNs for acute agitation as follows:   Haldol 2-5mg PO/IM Q6H PRN  Please transfer pt to inpatient psychiatric hospital when medically cleared and bed is available.  Labs/EKG/old records reviewed  Collateral obtained from brother Reaves; pending collateral from outpatient psychiatrist, Dr. Dubose. Provided phone number to provider's office for call-back.  Discussed the case with: Dr. Perez  Psychiatry will follow up.    Thank you for the consult.     Sitter: 1:1  Phone: Hospital phone, family, supervised  Visitors: Brothers (Jean Paul and Cortes) and children (YOSELIN and Tressa)  Personal belongings: None     175.26

## 2024-10-09 ASSESSMENT — REFRACTION_MANIFEST
OD_SPHERE: -4.75
OS_CYLINDER: -2.25
OD_VA1: 20/30-
OS_CYLINDER: -2.25
OS_VA1: 20/40
OD_CYLINDER: -1.50
OS_AXIS: 145
OD_SPHERE: -4.75
OS_VA1: 20/40
OS_AXIS: 140
OD_VA1: 20/30-
OS_SPHERE: -4.50
OS_SPHERE: -4.50
OS_CYLINDER: -2.75
OS_AXIS: 140
OD_AXIS: 170
OD_AXIS: 170
OS_SPHERE: -4.50
OD_CYLINDER: -1.50

## 2024-10-09 ASSESSMENT — KERATOMETRY
OS_K2POWER_DIOPTERS: 46.25
OD_AXISANGLE_DEGREES: 085
OD_K1POWER_DIOPTERS: 45.25
OS_AXISANGLE_DEGREES: 050
OD_K2POWER_DIOPTERS: 47.00
OS_K1POWER_DIOPTERS: 44.75

## 2025-04-07 ENCOUNTER — NON-APPOINTMENT (OUTPATIENT)
Age: 35
End: 2025-04-07

## 2025-08-21 ENCOUNTER — OFFICE (OUTPATIENT)
Dept: URBAN - METROPOLITAN AREA CLINIC 94 | Facility: CLINIC | Age: 35
Setting detail: OPHTHALMOLOGY
End: 2025-08-21
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
## 2025-08-21 PROCEDURE — NO SHOW FE NO SHOW FEE: Performed by: OPTOMETRIST
